# Patient Record
Sex: MALE | Race: WHITE | Employment: FULL TIME | URBAN - METROPOLITAN AREA
[De-identification: names, ages, dates, MRNs, and addresses within clinical notes are randomized per-mention and may not be internally consistent; named-entity substitution may affect disease eponyms.]

---

## 2017-01-13 ENCOUNTER — HOSPITAL ENCOUNTER (OUTPATIENT)
Dept: CV DIAGNOSTICS | Facility: HOSPITAL | Age: 57
End: 2017-01-13
Attending: ORTHOPAEDIC SURGERY
Payer: COMMERCIAL

## 2017-01-13 ENCOUNTER — HOSPITAL ENCOUNTER (OUTPATIENT)
Dept: CV DIAGNOSTICS | Facility: HOSPITAL | Age: 57
Discharge: HOME | End: 2017-01-13
Attending: ORTHOPAEDIC SURGERY
Payer: COMMERCIAL

## 2017-01-13 DIAGNOSIS — Z00.00 ROUTINE GENERAL MEDICAL EXAMINATION AT A HEALTH CARE FACILITY: ICD-10-CM

## 2017-01-13 NOTE — IMAGING NOTE
4096 - Patient here in cardiac diagnostics for outpatient stress echo ordered by Dr. Winsome Booker. Resting ECG, LBBB. In reviewing EMR, he demonstrated LBBB during a nuclear exercise stress test in 2015.  Patient states Dr. Winsome Booker ordered the stress echo modal

## 2017-01-19 ENCOUNTER — HOSPITAL ENCOUNTER (OUTPATIENT)
Dept: NUCLEAR MEDICINE | Facility: HOSPITAL | Age: 57
Discharge: HOME | End: 2017-01-19
Attending: EMERGENCY MEDICINE
Payer: COMMERCIAL

## 2017-01-19 ENCOUNTER — HOSPITAL ENCOUNTER (OUTPATIENT)
Dept: CV DIAGNOSTICS | Facility: HOSPITAL | Age: 57
Discharge: HOME | End: 2017-01-19
Attending: EMERGENCY MEDICINE
Payer: COMMERCIAL

## 2017-01-19 DIAGNOSIS — Z00.00 ROUTINE GENERAL MEDICAL EXAMINATION AT A HEALTH CARE FACILITY: ICD-10-CM

## 2017-01-19 PROCEDURE — 78452 HT MUSCLE IMAGE SPECT MULT: CPT | Performed by: RADIOLOGY

## 2017-01-19 PROCEDURE — 93017 CV STRESS TEST TRACING ONLY: CPT | Performed by: RADIOLOGY

## 2017-01-19 RX ORDER — SODIUM CHLORIDE 9 MG/ML
INJECTION, SOLUTION INTRAVENOUS
Status: COMPLETED
Start: 2017-01-19 | End: 2017-01-19

## 2017-01-19 RX ADMIN — SODIUM CHLORIDE: 9 INJECTION, SOLUTION INTRAVENOUS at 09:05:00

## 2017-02-02 ENCOUNTER — OFFICE VISIT (OUTPATIENT)
Dept: INTERNAL MEDICINE CLINIC | Facility: CLINIC | Age: 57
End: 2017-02-02

## 2017-02-02 VITALS
HEART RATE: 64 BPM | DIASTOLIC BLOOD PRESSURE: 78 MMHG | RESPIRATION RATE: 18 BRPM | WEIGHT: 189 LBS | HEIGHT: 70 IN | TEMPERATURE: 98 F | SYSTOLIC BLOOD PRESSURE: 138 MMHG | BODY MASS INDEX: 27.06 KG/M2

## 2017-02-02 DIAGNOSIS — Z82.49 FAMILY HISTORY OF HYPERTROPHIC CARDIOMYOPATHY: ICD-10-CM

## 2017-02-02 DIAGNOSIS — I10 ESSENTIAL HYPERTENSION: Primary | ICD-10-CM

## 2017-02-02 DIAGNOSIS — K50.00 CROHN'S DISEASE INVOLVING TERMINAL ILEUM (HCC): ICD-10-CM

## 2017-02-02 PROCEDURE — 99212 OFFICE O/P EST SF 10 MIN: CPT | Performed by: INTERNAL MEDICINE

## 2017-02-02 PROCEDURE — 99213 OFFICE O/P EST LOW 20 MIN: CPT | Performed by: INTERNAL MEDICINE

## 2017-02-02 RX ORDER — NEBIVOLOL 5 MG/1
5 TABLET ORAL
Qty: 90 TABLET | Refills: 3 | Status: SHIPPED | OUTPATIENT
Start: 2017-02-02 | End: 2018-03-24

## 2017-02-02 RX ORDER — PANTOPRAZOLE SODIUM 40 MG/1
40 TABLET, DELAYED RELEASE ORAL 2 TIMES DAILY
Qty: 180 TABLET | Refills: 3 | Status: SHIPPED | OUTPATIENT
Start: 2017-02-02 | End: 2021-10-29 | Stop reason: ALTCHOICE

## 2017-02-02 RX ORDER — EZETIMIBE AND SIMVASTATIN 10; 20 MG/1; MG/1
1 TABLET ORAL NIGHTLY
Qty: 90 TABLET | Refills: 3 | Status: SHIPPED | OUTPATIENT
Start: 2017-02-02 | End: 2018-03-24

## 2017-02-02 NOTE — PROGRESS NOTES
HPI:    Patient ID: Brice Franklin is a 65year old male. HPI  Patient is here to establish care with new physician. Previous doctor retired. Underlying Crohn's disease, hypertension. Medications reviewed. No recent changes.   Crohn's disease been w HENT: Negative for hearing loss. Eyes: Negative for visual disturbance. Respiratory: Negative for cough and shortness of breath. Cardiovascular: Negative for chest pain and palpitations.    Gastrointestinal: Negative for nausea, vomiting, abdomina mass. There is no tenderness. Musculoskeletal: He exhibits no tenderness. Lymphadenopathy:     He has no cervical adenopathy. Neurological: He is alert. Skin: Skin is warm and dry. No rash noted. Psychiatric: He has a normal mood and affect.  [facility]

## 2017-02-02 NOTE — TELEPHONE ENCOUNTER
Hypertensive Medications  Protocol Criteria:  · Appointment scheduled in the past 6 months or in the next 3 months  · BMP or CMP in the past 12 months  · Creatinine result < 2  Recent Visits       Provider Department Primary Dx    10 months ago MICHEL Crocker

## 2017-02-03 ENCOUNTER — HOSPITAL ENCOUNTER (OUTPATIENT)
Dept: CV DIAGNOSTICS | Facility: HOSPITAL | Age: 57
Discharge: HOME | End: 2017-02-03
Attending: INTERNAL MEDICINE
Payer: COMMERCIAL

## 2017-02-03 DIAGNOSIS — Z82.49 FAMILY HISTORY OF HYPERTROPHIC CARDIOMYOPATHY: ICD-10-CM

## 2017-02-03 DIAGNOSIS — I10 ESSENTIAL HYPERTENSION: ICD-10-CM

## 2017-02-03 PROCEDURE — 93306 TTE W/DOPPLER COMPLETE: CPT

## 2017-02-03 PROCEDURE — 93306 TTE W/DOPPLER COMPLETE: CPT | Performed by: INTERNAL MEDICINE

## 2017-02-03 RX ORDER — EZETIMIBE/SIMVASTATIN 10 MG-20MG
TABLET ORAL
Qty: 90 TABLET | Refills: 2 | OUTPATIENT
Start: 2017-02-03

## 2017-02-03 RX ORDER — NEBIVOLOL HYDROCHLORIDE 5 MG/1
TABLET ORAL
Qty: 90 TABLET | Refills: 2 | OUTPATIENT
Start: 2017-02-03

## 2017-02-20 RX ORDER — MESALAMINE 500 MG/1
CAPSULE ORAL
Qty: 540 CAPSULE | Refills: 0 | Status: SHIPPED | OUTPATIENT
Start: 2017-02-20 | End: 2017-03-23

## 2017-02-20 NOTE — TELEPHONE ENCOUNTER
Pending Prescriptions Disp Refills    PENTASA 500 MG Oral Cap CR [Pharmacy Med Name: PENTASA CR CAPS 500MG] 540 capsule 0     Sig: TAKE 3 CAPSULES (1500 MG TOTAL) TWICE A DAY         LOV 11/16/15  LR 06/21/16  EGD 12/22/15    em

## 2017-02-20 NOTE — TELEPHONE ENCOUNTER
Please contact the patient. I have refilled his prescription for 3 months. He should be seen in the office in follow-up.

## 2017-03-23 ENCOUNTER — OFFICE VISIT (OUTPATIENT)
Dept: GASTROENTEROLOGY | Facility: CLINIC | Age: 57
End: 2017-03-23

## 2017-03-23 VITALS
WEIGHT: 194.19 LBS | BODY MASS INDEX: 27.8 KG/M2 | HEIGHT: 70 IN | DIASTOLIC BLOOD PRESSURE: 82 MMHG | HEART RATE: 73 BPM | SYSTOLIC BLOOD PRESSURE: 115 MMHG

## 2017-03-23 DIAGNOSIS — K50.00 CROHN'S DISEASE INVOLVING TERMINAL ILEUM (HCC): Primary | ICD-10-CM

## 2017-03-23 PROCEDURE — 99213 OFFICE O/P EST LOW 20 MIN: CPT | Performed by: INTERNAL MEDICINE

## 2017-03-23 PROCEDURE — 99212 OFFICE O/P EST SF 10 MIN: CPT | Performed by: INTERNAL MEDICINE

## 2017-03-23 NOTE — PATIENT INSTRUCTIONS
1.  Continue current medications. 2.  Blood work yearly. 3.  Follow-up upper endoscopy later this year or early next year. 4.  Follow-up colonoscopy in June 2019.  5.  Follow-up office visit yearly.

## 2017-03-23 NOTE — PROGRESS NOTES
HPI:    Patient ID: Kari Hernandez is a 65year old male. HPI  Carmen Ramirez returns in follow-up. He was last seen at the time of his upper endoscopy in December 2015.   As per previous notes he has over a 20 year history of short segment (7 cm) ileal Crohn's d 500 MG Oral Cap CR TAKE 3 CAPSULES (1500 MG TOTAL) TWICE A DAY Disp: 540 capsule Rfl: 0   Ezetimibe-Simvastatin (VYTORIN) 10-20 MG Oral Tab Take 1 tablet by mouth nightly.  at bedtime Disp: 90 tablet Rfl: 3   Nebivolol HCl (BYSTOLIC) 5 MG Oral Tab Take 1 ta There was a focal area of inflammation in the transverse colon. He is asymptomatic on mesalamine. I am recommending that he continue. Refills for 1 year provided. I have asked him to avoid NSAIDs as much as possible.   Follow-up colonoscopy would be con

## 2017-04-29 ENCOUNTER — HOSPITAL ENCOUNTER (OUTPATIENT)
Age: 57
Discharge: HOME | End: 2017-04-29
Payer: COMMERCIAL

## 2017-04-29 ENCOUNTER — APPOINTMENT (OUTPATIENT)
Dept: GENERAL RADIOLOGY | Age: 57
End: 2017-04-29
Attending: PHYSICIAN ASSISTANT
Payer: COMMERCIAL

## 2017-04-29 VITALS
RESPIRATION RATE: 12 BRPM | OXYGEN SATURATION: 96 % | TEMPERATURE: 97 F | SYSTOLIC BLOOD PRESSURE: 129 MMHG | WEIGHT: 185 LBS | HEIGHT: 70 IN | BODY MASS INDEX: 26.48 KG/M2 | DIASTOLIC BLOOD PRESSURE: 74 MMHG | HEART RATE: 56 BPM

## 2017-04-29 DIAGNOSIS — S20.221A BACK CONTUSION, RIGHT, INITIAL ENCOUNTER: Primary | ICD-10-CM

## 2017-04-29 PROCEDURE — 71100 X-RAY EXAM RIBS UNI 2 VIEWS: CPT

## 2017-04-29 PROCEDURE — 99213 OFFICE O/P EST LOW 20 MIN: CPT

## 2017-04-29 PROCEDURE — 99214 OFFICE O/P EST MOD 30 MIN: CPT

## 2017-04-29 RX ORDER — CYCLOBENZAPRINE HCL 10 MG
10 TABLET ORAL 3 TIMES DAILY PRN
Qty: 20 TABLET | Refills: 0 | Status: SHIPPED | OUTPATIENT
Start: 2017-04-29 | End: 2017-05-06

## 2017-04-29 RX ORDER — HYDROCODONE BITARTRATE AND ACETAMINOPHEN 5; 325 MG/1; MG/1
1-2 TABLET ORAL EVERY 4 HOURS PRN
Qty: 15 TABLET | Refills: 0 | Status: SHIPPED | OUTPATIENT
Start: 2017-04-29 | End: 2017-05-06

## 2017-04-29 NOTE — ED PROVIDER NOTES
[facility]    History   Patient presents with:  Back Pain (musculoskeletal)    Stated Complaint: Lower Back Pain    HPI    Patient is a 43-year-old male who presents for evaluation of right-sided back franc Fexofenadine-Pseudoephed ER (ALLEGRA-D)  MG Oral Tablet 12 Hr,  Take 1 tablet by mouth 2 (two) times daily. acetaminophen (TYLENOL EXTRA STRENGTH) 500 MG Oral Tab,  Take  by mouth. ibuprofen (ADVIL) 200 MG Oral Tab,  Take  by mouth.        Family Head: Normocephalic and atraumatic.    Right Ear: External ear normal.   Left Ear: External ear normal.   Nose: Nose normal.   Mouth/Throat: Oropharynx is clear and moist.   Eyes: Conjunctivae and EOM are normal. Pupils are equal, round, and reactive to lig

## 2017-04-29 NOTE — ED INITIAL ASSESSMENT (HPI)
Pt arrived ambulatory to rm #2 with steady gait. Pt sts \"I was walking on the stairs and my dog ran into me. I fell over him and my lower back landed on the stairs\" Pt denies n/v/d. Denies dysuria. Pt denies numbness/tingling to legs.

## 2017-10-14 ENCOUNTER — HOSPITAL ENCOUNTER (OUTPATIENT)
Age: 57
Discharge: HOME | End: 2017-10-14
Attending: EMERGENCY MEDICINE
Payer: COMMERCIAL

## 2017-10-14 VITALS
TEMPERATURE: 97 F | HEART RATE: 64 BPM | SYSTOLIC BLOOD PRESSURE: 137 MMHG | DIASTOLIC BLOOD PRESSURE: 74 MMHG | OXYGEN SATURATION: 100 % | WEIGHT: 185 LBS | HEIGHT: 70 IN | RESPIRATION RATE: 18 BRPM | BODY MASS INDEX: 26.48 KG/M2

## 2017-10-14 DIAGNOSIS — S29.011A MUSCLE STRAIN OF CHEST WALL, INITIAL ENCOUNTER: Primary | ICD-10-CM

## 2017-10-14 DIAGNOSIS — S29.019A STRAIN OF THORACIC REGION, INITIAL ENCOUNTER: ICD-10-CM

## 2017-10-14 PROCEDURE — 99214 OFFICE O/P EST MOD 30 MIN: CPT

## 2017-10-14 PROCEDURE — 99213 OFFICE O/P EST LOW 20 MIN: CPT

## 2017-10-14 RX ORDER — METAXALONE 800 MG/1
800 TABLET ORAL EVERY 8 HOURS PRN
Qty: 20 TABLET | Refills: 0 | Status: SHIPPED | OUTPATIENT
Start: 2017-10-14 | End: 2018-05-30

## 2017-10-14 NOTE — ED INITIAL ASSESSMENT (HPI)
REPORTS LEFT SIDED MID BACK PAIN, PATIENT STATES HE WENT TO  AN OBJECT FROM THE FLOOR YESTERDAY AND EXPERIENCED A SPASM IN THAT AREA. DENIES DIFFICULTLY URINATING.

## 2017-10-14 NOTE — ED PROVIDER NOTES
Patient Seen in: [facility]    History   Patient presents with:  Back Pain (musculoskeletal)    Stated Complaint: Back pain    HPI    The patient is a 21-year-old male with a history of Crohn's disease who presents with c elements reviewed from today and agreed except as otherwise stated in HPI.     Physical Exam   ED Triage Vitals [10/14/17 1033]  BP: 137/74  Pulse: 64  Resp: 18  Temp: (!) 97.3 °F (36.3 °C)  Temp src: Oral  SpO2: 100 %  O2 Device: None (Room air)    Current for a visit in 2 days  For Recheck      Medications Prescribed:  Discharge Medication List as of 10/14/2017 10:57 AM    START taking these medications    Metaxalone 800 MG Oral Tab  Take 1 tablet (800 mg total) by mouth every 8 (eight) hours as needed for

## 2017-10-19 ENCOUNTER — OFFICE VISIT (OUTPATIENT)
Dept: INTERNAL MEDICINE CLINIC | Facility: CLINIC | Age: 57
End: 2017-10-19

## 2017-10-19 VITALS
HEART RATE: 61 BPM | WEIGHT: 191 LBS | DIASTOLIC BLOOD PRESSURE: 82 MMHG | BODY MASS INDEX: 27.35 KG/M2 | TEMPERATURE: 98 F | HEIGHT: 70 IN | SYSTOLIC BLOOD PRESSURE: 126 MMHG

## 2017-10-19 DIAGNOSIS — M54.50 ACUTE LEFT-SIDED LOW BACK PAIN WITHOUT SCIATICA: Primary | ICD-10-CM

## 2017-10-19 PROCEDURE — 99213 OFFICE O/P EST LOW 20 MIN: CPT | Performed by: INTERNAL MEDICINE

## 2017-10-19 PROCEDURE — 99212 OFFICE O/P EST SF 10 MIN: CPT | Performed by: INTERNAL MEDICINE

## 2017-10-19 NOTE — PROGRESS NOTES
Patient ID: Rosario Fulton is a 65year old male. Patient presents with: Follow - Up: injured back while bending over 10/14 went to immediate care, need clearance note for work       HISTORY OF PRESENT ILLNESS:   HPI  Patient presents for above.   He in (BYSTOLIC) 5 MG Oral Tab, Take 1 tablet (5 mg total) by mouth once daily. , Disp: 90 tablet, Rfl: 3  •  Pantoprazole Sodium 40 MG Oral Tab EC, Take 1 tablet (40 mg total) by mouth 2 (two) times daily. , Disp: 180 tablet, Rfl: 3  •  Fexofenadine-Pseudoephed E

## 2017-10-19 NOTE — PATIENT INSTRUCTIONS
Back Care Tips    Caring for your back  These are things you can do to prevent a recurrence of acute back pain and to reduce symptoms from chronic back pain:  · Maintain a healthy weight.  If you are overweight, losing weight will help most types of back · Be careful if you are given prescription pain medicines, narcotics, or medicine for muscle spasm. They can cause drowsiness, affect your coordination, reflexes, and judgment. Do not drive or operate heavy machinery while taking these types of medicines. The best way to sleep is on your side with your knees bent. Put a low pillow under your head to support your neck in a neutral spine position. Avoid thick pillows that bend your neck to one side.  Put a pillow between your legs to further relax your lower b The point of stretching is to [facility] more flexible and increase your range of motion. Stretch only as much as you are able. Stretch slowly. Do not push your stretch to the limit.  If at any point you feel pain while stretching, this is your (temporary) li 2. Heel raises: Stand facing the wall. Slowly raise the heels of your feet up and down, while keeping your toes on the floor. If you have trouble balancing, you can touch the wall with your hands. Repeat 10 times.   More advanced exercises  When you feel co © 0236-9357 [employer] [address]. [address] [facility] [place] [place]. All rights reserved. This information is not intended as a substitute for professional medical care. Always follow your healthcare professional's instructions.

## 2017-11-09 ENCOUNTER — OFFICE VISIT (OUTPATIENT)
Dept: OTHER | Facility: HOSPITAL | Age: 57
End: 2017-11-09
Attending: ORTHOPAEDIC SURGERY

## 2017-11-09 DIAGNOSIS — Z00.00 ROUTINE GENERAL MEDICAL EXAMINATION AT A HEALTH CARE FACILITY: Primary | ICD-10-CM

## 2017-11-09 PROCEDURE — 85025 COMPLETE CBC W/AUTO DIFF WBC: CPT

## 2017-11-09 PROCEDURE — 80053 COMPREHEN METABOLIC PANEL: CPT

## 2017-11-09 PROCEDURE — 84153 ASSAY OF PSA TOTAL: CPT

## 2017-11-09 PROCEDURE — 80061 LIPID PANEL: CPT

## 2017-11-09 PROCEDURE — 81001 URINALYSIS AUTO W/SCOPE: CPT

## 2017-11-21 ENCOUNTER — APPOINTMENT (OUTPATIENT)
Dept: OTHER | Facility: HOSPITAL | Age: 57
End: 2017-11-21
Attending: ORTHOPAEDIC SURGERY

## 2018-01-12 ENCOUNTER — OFFICE VISIT (OUTPATIENT)
Dept: INTERNAL MEDICINE CLINIC | Facility: CLINIC | Age: 58
End: 2018-01-12

## 2018-01-12 VITALS
BODY MASS INDEX: 27.63 KG/M2 | HEART RATE: 64 BPM | DIASTOLIC BLOOD PRESSURE: 81 MMHG | HEIGHT: 70 IN | SYSTOLIC BLOOD PRESSURE: 120 MMHG | TEMPERATURE: 98 F | WEIGHT: 193 LBS

## 2018-01-12 DIAGNOSIS — R42 VERTIGO: Primary | ICD-10-CM

## 2018-01-12 DIAGNOSIS — H92.03 EAR PAIN, BILATERAL: ICD-10-CM

## 2018-01-12 PROCEDURE — 99212 OFFICE O/P EST SF 10 MIN: CPT | Performed by: INTERNAL MEDICINE

## 2018-01-12 PROCEDURE — 99214 OFFICE O/P EST MOD 30 MIN: CPT | Performed by: INTERNAL MEDICINE

## 2018-01-12 RX ORDER — ONDANSETRON 4 MG/1
4 TABLET, ORALLY DISINTEGRATING ORAL EVERY 8 HOURS PRN
Qty: 20 TABLET | Refills: 0 | Status: SHIPPED | OUTPATIENT
Start: 2018-01-12

## 2018-01-12 NOTE — PROGRESS NOTES
Patient ID: Leelee Espinosa is a 63year old male. Patient presents with:  Vertigo: on/off 6 months, has been getting worse. HISTORY OF PRESENT ILLNESS:   HPI  Patient presents for above. Here with a six-month history of vertigo.   States he had si Outpatient Prescriptions:   •  ondansetron 4 MG Oral Tablet Dispersible, Take 1 tablet (4 mg total) by mouth every 8 (eight) hours as needed for Nausea., Disp: 20 tablet, Rfl: 0  •  Mesalamine ER (PENTASA) 500 MG Oral Cap CR, Take 3 capsules (1,500 mg tota well-developed and well-nourished. HENT:   Right Ear: External ear normal.   Left Ear: External ear normal.   Mouth/Throat: No oropharyngeal exudate, posterior oropharyngeal edema, posterior oropharyngeal erythema or tonsillar abscesses.    Eyes: Conjunct

## 2018-01-16 ENCOUNTER — OFFICE VISIT (OUTPATIENT)
Dept: OTOLARYNGOLOGY | Facility: CLINIC | Age: 58
End: 2018-01-16

## 2018-01-16 ENCOUNTER — OFFICE VISIT (OUTPATIENT)
Dept: AUDIOLOGY | Facility: CLINIC | Age: 58
End: 2018-01-16

## 2018-01-16 VITALS
DIASTOLIC BLOOD PRESSURE: 76 MMHG | BODY MASS INDEX: 27.63 KG/M2 | TEMPERATURE: 98 F | HEIGHT: 70 IN | WEIGHT: 193 LBS | SYSTOLIC BLOOD PRESSURE: 118 MMHG

## 2018-01-16 DIAGNOSIS — R42 DIZZINESS AND GIDDINESS: Primary | ICD-10-CM

## 2018-01-16 DIAGNOSIS — R42 DIZZINESS: Primary | ICD-10-CM

## 2018-01-16 PROCEDURE — 92567 TYMPANOMETRY: CPT | Performed by: AUDIOLOGIST

## 2018-01-16 PROCEDURE — 99243 OFF/OP CNSLTJ NEW/EST LOW 30: CPT | Performed by: OTOLARYNGOLOGY

## 2018-01-16 PROCEDURE — 92557 COMPREHENSIVE HEARING TEST: CPT | Performed by: AUDIOLOGIST

## 2018-01-16 PROCEDURE — 99212 OFFICE O/P EST SF 10 MIN: CPT | Performed by: OTOLARYNGOLOGY

## 2018-01-16 NOTE — PROGRESS NOTES
AUDIOGRAM     Leanne Mir was referred for testing by Alisia Nguyen V due to dizziness. 1/8/1960  [id number]      Otoscopic Inspection:  Right ear:  No cerumen  Left ear:  No cerumen    Audiometric Test Results:   Audiometric thresholds indicated norm

## 2018-01-16 NOTE — PROGRESS NOTES
Karely Parsons is a 63year old male. Patient presents with:  Dizziness: dizziness for a while    HPI:   For the last several months he has been experiencing dizziness. He describes issues with feeling as though he will fall over while walking.  He did hav well otherwise  GENERAL : denies fever, chills, sweats, weight loss, weight gain  SKIN: denies any unusual skin lesions or rashes  RESPIRATORY: denies shortness of breath with exertion  NEURO: denies headaches    EXAM:   /76   Temp (!) 97.5 °F (36.4

## 2018-01-19 ENCOUNTER — OFFICE VISIT (OUTPATIENT)
Dept: AUDIOLOGY | Facility: CLINIC | Age: 58
End: 2018-01-19

## 2018-01-19 DIAGNOSIS — R42 DIZZINESS: Primary | ICD-10-CM

## 2018-01-19 PROCEDURE — 92585 AUDITORY EVOKED POTENTIAL: CPT | Performed by: AUDIOLOGIST

## 2018-01-19 PROCEDURE — 92540 BASIC VESTIBULAR EVALUATION: CPT | Performed by: AUDIOLOGIST

## 2018-01-19 PROCEDURE — 92537 CALORIC VSTBLR TEST W/REC: CPT | Performed by: AUDIOLOGIST

## 2018-01-22 ENCOUNTER — TELEPHONE (OUTPATIENT)
Dept: OTOLARYNGOLOGY | Facility: CLINIC | Age: 58
End: 2018-01-22

## 2018-01-22 DIAGNOSIS — R42 DIZZINESS: Primary | ICD-10-CM

## 2018-01-23 NOTE — PROGRESS NOTES
Videonystagmography   (VNG)    Suzanna Ramos is a 63year old male     Referring Provider: Vianey Martell   YOB: 1960  Medical Record: [id number]                           History:  Patient indicated a previous history of positional vertigo sufficient strength to be considered clinically significant.      Bithermal Caloric Test:  Left cool caloric:   14  deg/sec  Right cool caloric:  15 deg/sec  Left warm caloric:    9 deg/sec  Right warm caloric:  13 deg/sec    NOTE:  Patient could not comple

## 2018-01-23 NOTE — TELEPHONE ENCOUNTER
Patient called back again to check status on results. Relayed message below from . Will await call back for results and was thankful. Please advise. Thank you.

## 2018-01-23 NOTE — TELEPHONE ENCOUNTER
Please let him know that the test takes a few days to interpret. I will let him know when I get the results.

## 2018-01-24 NOTE — TELEPHONE ENCOUNTER
I thought I sent a message that his VNG shows a weakness in the balance system in the inner ear. I would recommend vestibular rehab for him.

## 2018-01-24 NOTE — TELEPHONE ENCOUNTER
Per :  Please inform the patient that the VN G does show that there may be some weakness in the inner ear especially on the left.   I would recommend that he consider vestibular rehab      Electronically signed by Stas Restrepo MD at 2018  6:03 PM

## 2018-01-24 NOTE — PROGRESS NOTES
Please inform the patient that the VN G does show that there may be some weakness in the inner ear especially on the left.   I would recommend that he consider vestibular rehab

## 2018-01-25 ENCOUNTER — OFFICE VISIT (OUTPATIENT)
Dept: INTERNAL MEDICINE CLINIC | Facility: CLINIC | Age: 58
End: 2018-01-25

## 2018-01-25 VITALS
HEIGHT: 70 IN | BODY MASS INDEX: 27.49 KG/M2 | HEART RATE: 56 BPM | WEIGHT: 192 LBS | TEMPERATURE: 98 F | SYSTOLIC BLOOD PRESSURE: 129 MMHG | DIASTOLIC BLOOD PRESSURE: 84 MMHG

## 2018-01-25 DIAGNOSIS — R42 VERTIGO: Primary | ICD-10-CM

## 2018-01-25 PROCEDURE — 99213 OFFICE O/P EST LOW 20 MIN: CPT | Performed by: INTERNAL MEDICINE

## 2018-01-25 PROCEDURE — 99212 OFFICE O/P EST SF 10 MIN: CPT | Performed by: INTERNAL MEDICINE

## 2018-01-25 NOTE — PATIENT INSTRUCTIONS
Dizziness (Uncertain Cause)  Dizziness is a common symptom. It may be described as lightheadedness, spinning, or feeling like you are going to faint. Dizziness can have many causes.   Be sure to tell the healthcare provider about:  · All medicines you katherine Date Last Reviewed: 11/1/2017  © 9891-4634 [employer] [address]. [address] [facility] [place], [address] [place]. All rights reserved. This information is not intended as a substitute for professional medical care.  Always follow your healthcare professional

## 2018-01-26 ENCOUNTER — OFFICE VISIT (OUTPATIENT)
Dept: PHYSICAL THERAPY | Facility: HOSPITAL | Age: 58
End: 2018-01-26
Attending: OTOLARYNGOLOGY
Payer: COMMERCIAL

## 2018-01-26 DIAGNOSIS — R42 DIZZINESS: ICD-10-CM

## 2018-01-26 PROCEDURE — 97161 PT EVAL LOW COMPLEX 20 MIN: CPT

## 2018-01-26 NOTE — PROGRESS NOTES
PHYSICAL THERAPY EVALUATION:   Referring Physician: Dr. Kai Posey  Diagnosis: BPPV      Date of Onset: Per HPI Date of Service: 1/26/2018     PATIENT SUMMARY   Kitty Canavan is a 63year old y/o male who presents to therapy today with reports of vertigo posterior canal BPPV. CRM performed x3 with resolution of nystagmus on third CRM. Maneuvers tolerated well. Pt verbalized understanding and appreciation of material taught.   Pt. would benefit from skilled Physical Therapy to address the above impairments t tie shoes without symptoms. Frequency / Duration: Patient will be seen for 1-2 x/week or a total of 6 visits over a 90 day period.   Treatment will include: Home exercise program development and instruction, Patient/family education, Balance training, C

## 2018-01-29 ENCOUNTER — OFFICE VISIT (OUTPATIENT)
Dept: PHYSICAL THERAPY | Facility: HOSPITAL | Age: 58
End: 2018-01-29
Attending: OTOLARYNGOLOGY
Payer: COMMERCIAL

## 2018-01-29 DIAGNOSIS — R42 DIZZINESS: ICD-10-CM

## 2018-01-29 PROCEDURE — 95992 CANALITH REPOSITIONING PROC: CPT

## 2018-01-29 NOTE — PROGRESS NOTES
Diagnosis: BPPV  Visit (# authorized): 2      Referring MD(next visit): Dr. Kari Hernandez V  Precautions:  None  Medication Changes since last visit?: No    Subjective: Denies positional symptoms since day of last visit. No current complaints of dizziness.      Obj

## 2018-01-31 ENCOUNTER — APPOINTMENT (OUTPATIENT)
Dept: PHYSICAL THERAPY | Facility: HOSPITAL | Age: 58
End: 2018-01-31
Attending: OTOLARYNGOLOGY
Payer: COMMERCIAL

## 2018-02-02 ENCOUNTER — TELEPHONE (OUTPATIENT)
Dept: OTOLARYNGOLOGY | Facility: CLINIC | Age: 58
End: 2018-02-02

## 2018-02-02 ENCOUNTER — OFFICE VISIT (OUTPATIENT)
Dept: PHYSICAL THERAPY | Facility: HOSPITAL | Age: 58
End: 2018-02-02
Attending: OTOLARYNGOLOGY
Payer: COMMERCIAL

## 2018-02-02 ENCOUNTER — TELEPHONE (OUTPATIENT)
Dept: ADMINISTRATIVE | Age: 58
End: 2018-02-02

## 2018-02-02 PROCEDURE — 97112 NEUROMUSCULAR REEDUCATION: CPT

## 2018-02-02 NOTE — PROGRESS NOTES
Patient Name: Leelee Espinosa, : 1960, MRN: [id number]   Date:  2018  Referring Physician:  Gloria Adams V    Diagnosis: BPPV    Discharge Summary    Pt has attended 3, cancelled 0, and no shown 0 visits in Physical Therapy.      Progress Note Time: ____6___seconds  __3____9. Ambulating backward  __3____10.  Steps    TOTAL SCORE: __30__/30    (less than 22/30 = fall risk)    Romberg: EO 30 sec EC 30 sec (sway WNL)  Tandem: R foot back: EO 30 sec, EC 30 sec; L foot back: EO: 30 sec, EC: 30 sec   S

## 2018-02-02 NOTE — PATIENT INSTRUCTIONS
Do these exercises 1-2 times a day. 1. Forearm plank, head in neutral. 30 seconds, 2 times. Side planks x1 on each side for 30 seconds. 2. Stand next to a countertop or with your bed behind you for safety. Stand tall on one foot.   Cross arms, clos

## 2018-02-02 NOTE — TELEPHONE ENCOUNTER
Patient finished physical therapy today, wants a return to work note and wants to talk to Dr Meera Hampton.

## 2018-02-02 NOTE — TELEPHONE ENCOUNTER
SARA packet emailed to pt '[email or web address]'. His HR requires form completed, pt has been out for more than 3 days for vertigo. Forms pending.  NK

## 2018-02-03 NOTE — TELEPHONE ENCOUNTER
Per pt, he is doing well; he is not experiencing the spinning sensation, no eye movement, has some balance issues every now and then but was given exercises from physical therapy. Pt is requesting a [id number]-[id number] note for Monday.     Dr. Leelee Espinosa, return to

## 2018-02-06 ENCOUNTER — HOSPITAL ENCOUNTER (OUTPATIENT)
Dept: GENERAL RADIOLOGY | Age: 58
Discharge: HOME | End: 2018-02-06
Attending: INTERNAL MEDICINE
Payer: COMMERCIAL

## 2018-02-06 ENCOUNTER — APPOINTMENT (OUTPATIENT)
Dept: PHYSICAL THERAPY | Facility: HOSPITAL | Age: 58
End: 2018-02-06
Attending: OTOLARYNGOLOGY
Payer: COMMERCIAL

## 2018-02-06 ENCOUNTER — OFFICE VISIT (OUTPATIENT)
Dept: INTERNAL MEDICINE CLINIC | Facility: CLINIC | Age: 58
End: 2018-02-06

## 2018-02-06 VITALS
WEIGHT: 192 LBS | HEART RATE: 66 BPM | HEIGHT: 70 IN | SYSTOLIC BLOOD PRESSURE: 131 MMHG | TEMPERATURE: 98 F | BODY MASS INDEX: 27.49 KG/M2 | DIASTOLIC BLOOD PRESSURE: 76 MMHG

## 2018-02-06 DIAGNOSIS — M54.2 CERVICALGIA: ICD-10-CM

## 2018-02-06 DIAGNOSIS — R42 VERTIGO: Primary | ICD-10-CM

## 2018-02-06 PROCEDURE — 99213 OFFICE O/P EST LOW 20 MIN: CPT | Performed by: INTERNAL MEDICINE

## 2018-02-06 PROCEDURE — 99212 OFFICE O/P EST SF 10 MIN: CPT | Performed by: INTERNAL MEDICINE

## 2018-02-06 PROCEDURE — 72050 X-RAY EXAM NECK SPINE 4/5VWS: CPT | Performed by: INTERNAL MEDICINE

## 2018-02-06 RX ORDER — METHOCARBAMOL 750 MG/1
750 TABLET, FILM COATED ORAL 3 TIMES DAILY PRN
Qty: 30 TABLET | Refills: 0 | Status: SHIPPED | OUTPATIENT
Start: 2018-02-06 | End: 2018-05-30

## 2018-02-06 NOTE — PATIENT INSTRUCTIONS
Nonsurgical Treatment of Cervical Spine Problems  Cervical spine problems can often be treated without surgery. Choices include rest, medicines, or injections. Your healthcare provider may also suggest physical therapy or certain exercises.  These may all © 9937-4053 [employer] [address]. [address] [facility] [place], [address] [place]. All rights reserved. This information is not intended as a substitute for professional medical care. Always follow your healthcare professional's instructions.         Reach a © 3384-0562 [employer] [address]. [address] [facility] [place], [facility] [place]. All rights reserved. This information is not intended as a substitute for professional medical care. Always follow your healthcare professional's instructions.         Shoulde To start, sit in a chair with your feet flat on the floor. Shift your weight slightly forward to avoid rounding your back. Relax. Keep your ears, shoulders, and hips aligned:  · Raise your arms to shoulder height, elbows bent and palms forward.   · Move you · Raise one arm overhead, then lower it. As you lower that arm, raise the other arm. · Continue to move both arms in slow, smooth arcs. Keep your arms straight and your head and neck relaxed. · Repeat 10 times with each arm.   For your safety, check with

## 2018-02-06 NOTE — PROGRESS NOTES
Patient ID: Matilde Dow is a 63year old male. Patient presents with:  Neck Pain: along with headaches that may be related and back pain has not improved. HISTORY OF PRESENT ILLNESS:   HPI  Patient presents for above.   Here with neck pain with (two) times daily. , Disp: 540 capsule, Rfl: 3  •  Ezetimibe-Simvastatin (VYTORIN) 10-20 MG Oral Tab, Take 1 tablet by mouth nightly.  at bedtime, Disp: 90 tablet, Rfl: 3  •  Nebivolol HCl (BYSTOLIC) 5 MG Oral Tab, Take 1 tablet (5 mg total) by mouth once da tenderness present. Cardiovascular: Normal rate, regular rhythm and normal heart sounds. Pulmonary/Chest: Effort normal and breath sounds normal. No respiratory distress. Musculoskeletal: Normal range of motion.    Neurological: He is alert and orien

## 2018-02-09 ENCOUNTER — APPOINTMENT (OUTPATIENT)
Dept: PHYSICAL THERAPY | Facility: HOSPITAL | Age: 58
End: 2018-02-09
Attending: OTOLARYNGOLOGY
Payer: COMMERCIAL

## 2018-02-12 ENCOUNTER — APPOINTMENT (OUTPATIENT)
Dept: PHYSICAL THERAPY | Facility: HOSPITAL | Age: 58
End: 2018-02-12
Attending: OTOLARYNGOLOGY
Payer: COMMERCIAL

## 2018-02-13 PROBLEM — H35.413 LATTICE DEGENERATION OF BOTH RETINAS: Status: ACTIVE | Noted: 2018-02-13

## 2018-02-13 PROBLEM — H35.033 HYPERTENSIVE RETINOPATHY OF BOTH EYES, GRADE 1: Status: ACTIVE | Noted: 2018-02-13

## 2018-02-15 ENCOUNTER — APPOINTMENT (OUTPATIENT)
Dept: PHYSICAL THERAPY | Facility: HOSPITAL | Age: 58
End: 2018-02-15
Attending: OTOLARYNGOLOGY
Payer: COMMERCIAL

## 2018-02-19 ENCOUNTER — APPOINTMENT (OUTPATIENT)
Dept: PHYSICAL THERAPY | Facility: HOSPITAL | Age: 58
End: 2018-02-19
Attending: OTOLARYNGOLOGY
Payer: COMMERCIAL

## 2018-03-25 RX ORDER — NEBIVOLOL HYDROCHLORIDE 5 MG/1
TABLET ORAL
Qty: 90 TABLET | Refills: 0 | Status: SHIPPED | OUTPATIENT
Start: 2018-03-25 | End: 2018-06-21

## 2018-03-25 RX ORDER — EZETIMIBE AND SIMVASTATIN 10; 20 MG/1; MG/1
TABLET ORAL
Qty: 90 TABLET | Refills: 0 | Status: SHIPPED | OUTPATIENT
Start: 2018-03-25 | End: 2018-06-21

## 2018-03-25 NOTE — TELEPHONE ENCOUNTER
Hypertensive Medications  Protocol Criteria:  · Appointment scheduled in the past 6 months or in the next 3 months  · BMP or CMP in the past 12 months  · Creatinine result < 2  Recent Outpatient Visits            1 month ago Hypertensive retinopathy of bot month ago [facility]    Office Visit    1 month ago [facility]    Office Visit        Future Appointments       Provider Department Appt Notes

## 2018-03-27 ENCOUNTER — APPOINTMENT (OUTPATIENT)
Dept: LAB | Age: 58
End: 2018-03-27
Attending: INTERNAL MEDICINE
Payer: COMMERCIAL

## 2018-03-27 ENCOUNTER — OFFICE VISIT (OUTPATIENT)
Dept: INTERNAL MEDICINE CLINIC | Facility: CLINIC | Age: 58
End: 2018-03-27

## 2018-03-27 VITALS
TEMPERATURE: 98 F | DIASTOLIC BLOOD PRESSURE: 80 MMHG | HEART RATE: 91 BPM | BODY MASS INDEX: 27.49 KG/M2 | HEIGHT: 70 IN | WEIGHT: 192 LBS | SYSTOLIC BLOOD PRESSURE: 128 MMHG

## 2018-03-27 DIAGNOSIS — Z00.00 ANNUAL PHYSICAL EXAM: Primary | ICD-10-CM

## 2018-03-27 DIAGNOSIS — Z00.00 ANNUAL PHYSICAL EXAM: ICD-10-CM

## 2018-03-27 DIAGNOSIS — K50.00 CROHN'S DISEASE INVOLVING TERMINAL ILEUM (HCC): ICD-10-CM

## 2018-03-27 DIAGNOSIS — I10 ESSENTIAL HYPERTENSION: ICD-10-CM

## 2018-03-27 DIAGNOSIS — R31.29 MICROSCOPIC HEMATURIA: ICD-10-CM

## 2018-03-27 DIAGNOSIS — N50.812 TESTICULAR PAIN, LEFT: ICD-10-CM

## 2018-03-27 DIAGNOSIS — R42 VERTIGO: ICD-10-CM

## 2018-03-27 LAB
BACTERIA UR QL AUTO: NEGATIVE /HPF
BILIRUB UR QL: NEGATIVE
CLARITY UR: CLEAR
COLOR UR: YELLOW
CREAT UR-MCNC: 151.2 MG/DL
GLUCOSE UR-MCNC: NEGATIVE MG/DL
KETONES UR-MCNC: NEGATIVE MG/DL
LEUKOCYTE ESTERASE UR QL STRIP.AUTO: NEGATIVE
MICROALBUMIN UR-MCNC: 0.7 MG/DL (ref 0–1.8)
MICROALBUMIN/CREAT UR: 4.6 MG/G{CREAT} (ref 0–20)
NITRITE UR QL STRIP.AUTO: NEGATIVE
PH UR: 5 [PH] (ref 5–8)
PROT UR-MCNC: NEGATIVE MG/DL
RBC #/AREA URNS AUTO: 2 /HPF
SP GR UR STRIP: 1.02 (ref 1–1.03)
TSH SERPL-ACNC: 1.15 UIU/ML (ref 0.45–5.33)
UROBILINOGEN UR STRIP-ACNC: <2
VIT C UR-MCNC: NEGATIVE MG/DL
WBC #/AREA URNS AUTO: 1 /HPF

## 2018-03-27 PROCEDURE — 36415 COLL VENOUS BLD VENIPUNCTURE: CPT

## 2018-03-27 PROCEDURE — 82043 UR ALBUMIN QUANTITATIVE: CPT

## 2018-03-27 PROCEDURE — 81001 URINALYSIS AUTO W/SCOPE: CPT

## 2018-03-27 PROCEDURE — 84443 ASSAY THYROID STIM HORMONE: CPT

## 2018-03-27 PROCEDURE — 82570 ASSAY OF URINE CREATININE: CPT

## 2018-03-27 PROCEDURE — 99396 PREV VISIT EST AGE 40-64: CPT | Performed by: INTERNAL MEDICINE

## 2018-03-27 PROCEDURE — 99213 OFFICE O/P EST LOW 20 MIN: CPT | Performed by: INTERNAL MEDICINE

## 2018-03-27 NOTE — PATIENT INSTRUCTIONS
Prevention Guidelines, Men Ages 48 to 59  Screening tests and vaccines are an important part of managing your health. Health counseling is essential, too. Below are guidelines for these, for men ages 48 to 59.  Talk with your healthcare provider to make s Prostate cancer Starting at age 39, talk to healthcare provider about risks and benefits of digital rectal exam (KIMBERLEY) and prostate-specific antigen (PSA) screening1 At routine exams   Syphilis Men at increased risk for infection – talk with your healthcare pertussis (Td/Tdap) booster All men in this age group Td every 10 years, or a one-time dose of Tdap instead of a Td booster after age 25, then Td every 8 years   Zoster All men ages 61 and older 1 dose   Counseling Who needs it How often   Diet and exerci To evaluate your condition, your doctor will first confirm that blood is indeed present. Then other tests are done to pinpoint where the blood is coming from and why. Your doctor will decide which tests will best determine the cause of your hematuria.  Some · To relieve pain and swelling, apply an ice pack wrapped in a thin towel for 10 minutes at a time. Continue this on and off for 1 to 2 days. · When lying down, place a small rolled towel under your scrotum.  When moving around, wear a jockstrap (athletic

## 2018-03-27 NOTE — PROGRESS NOTES
Patient ID: Otto Conway is a 63year old male. Patient presents with:  Physical       HISTORY OF PRESENT ILLNESS:   HPI  Patient presents for above. Here for annual physical and to discuss multiple medical issues.   Did labs through work in November Diagnosis Date   • [facility]    Colonoscopy    • Hiatal hernia 6-24-14   • Hyperlipidemia    • Ileitis 6-24-14   • Lipid screening 05/24/2012   • Pneumonia 1973   • Unspecified essential hypertension    • Varicella zoster        Past Surgi Smoking status: Never Smoker    Smokeless tobacco: Never Used    Alcohol use No    Comment: Rarely; beer and liquor     Drug use: No    Sexual activity: Not on file     Other Topics Concern    Caffeine Concern Yes    Comment: Daily; one cup, tea      Soci 17 - 63 U/L 32   AST (SGOT)      15 - 41 U/L 31   ALKALINE PHOSPHATASE      32 - 100 U/L 63   Total Bilirubin      0.3 - 1.2 mg/dL 1.6 (H)   TOTAL PROTEIN      5.9 - 8.4 g/dL 6.9   Albumin      3.5 - 4.8 g/dL 4.2   GLOBULIN, TOTAL      2.5 - 3.7 g/dL 2 1 - 149 mg/dL 122   NON HDL CHOL      <130 mg/dL 87   LDL Cholesterol Calc      0 - 99 mg/dL 63   PSA      0.0 - 4.0 ng/mL 1.8          ASSESSMENT/PLAN:   1. Annual physical exam  · TSH W REFLEX TO FREE T4; Future  · Other pertinent labs as above.   · F

## 2018-03-30 ENCOUNTER — HOSPITAL ENCOUNTER (OUTPATIENT)
Dept: ULTRASOUND IMAGING | Facility: HOSPITAL | Age: 58
Discharge: HOME | End: 2018-03-30
Attending: INTERNAL MEDICINE
Payer: COMMERCIAL

## 2018-03-30 DIAGNOSIS — N50.812 TESTICULAR PAIN, LEFT: ICD-10-CM

## 2018-03-30 PROCEDURE — 76870 US EXAM SCROTUM: CPT | Performed by: INTERNAL MEDICINE

## 2018-03-30 PROCEDURE — 93975 VASCULAR STUDY: CPT | Performed by: INTERNAL MEDICINE

## 2018-05-07 ENCOUNTER — OFFICE VISIT (OUTPATIENT)
Dept: SURGERY | Facility: CLINIC | Age: 58
End: 2018-05-07

## 2018-05-07 VITALS
BODY MASS INDEX: 27.2 KG/M2 | HEIGHT: 70 IN | HEART RATE: 69 BPM | TEMPERATURE: 98 F | SYSTOLIC BLOOD PRESSURE: 130 MMHG | WEIGHT: 190 LBS | DIASTOLIC BLOOD PRESSURE: 79 MMHG

## 2018-05-07 DIAGNOSIS — N50.82 SCROTAL PAIN: Primary | ICD-10-CM

## 2018-05-07 PROCEDURE — 99212 OFFICE O/P EST SF 10 MIN: CPT | Performed by: UROLOGY

## 2018-05-07 PROCEDURE — 99244 OFF/OP CNSLTJ NEW/EST MOD 40: CPT | Performed by: UROLOGY

## 2018-05-07 NOTE — PROGRESS NOTES
SUBJECTIVE:  Katey Meredith is a 63year old male who presents for a consultation at the request of, and a copy of this note will be sent to, dr. Joycelyn Allan, for evaluation of  Left scrotal pain. HAs had this intermittently for many ears.   Pain exacerbated by Mesfin Hunter Father 80     Lung Cancer   • Heart Disease Brother      Coronary Artery Disease       Social History: Smoking status: Never Smoker                                                              Smokeless tobacco: Never Used diagnosis)    No orders of the defined types were placed in this encounter. Reviewed findings at length with patient. Scrotal ultrasound performed in March shows a moderate left-sided varicocele and a mild left-sided hydrocele.   I do not believe both of

## 2018-05-23 ENCOUNTER — HOSPITAL ENCOUNTER (OUTPATIENT)
Age: 58
Discharge: HOME | End: 2018-05-23
Payer: COMMERCIAL

## 2018-05-23 VITALS
TEMPERATURE: 98 F | OXYGEN SATURATION: 100 % | SYSTOLIC BLOOD PRESSURE: 132 MMHG | RESPIRATION RATE: 18 BRPM | WEIGHT: 185 LBS | BODY MASS INDEX: 26.48 KG/M2 | HEART RATE: 61 BPM | HEIGHT: 70 IN | DIASTOLIC BLOOD PRESSURE: 73 MMHG

## 2018-05-23 DIAGNOSIS — M54.32 LEFT SIDED SCIATICA: Primary | ICD-10-CM

## 2018-05-23 PROCEDURE — 99213 OFFICE O/P EST LOW 20 MIN: CPT

## 2018-05-23 PROCEDURE — 99214 OFFICE O/P EST MOD 30 MIN: CPT

## 2018-05-23 RX ORDER — METHYLPREDNISOLONE 4 MG/1
TABLET ORAL
Qty: 1 PACKAGE | Refills: 0 | Status: SHIPPED | OUTPATIENT
Start: 2018-05-23 | End: 2018-05-28

## 2018-05-23 RX ORDER — DIAZEPAM 5 MG/1
5 TABLET ORAL 3 TIMES DAILY PRN
Qty: 15 TABLET | Refills: 0 | Status: SHIPPED | OUTPATIENT
Start: 2018-05-23 | End: 2018-05-30 | Stop reason: ALTCHOICE

## 2018-05-23 RX ORDER — HYDROCODONE BITARTRATE AND ACETAMINOPHEN 5; 325 MG/1; MG/1
1 TABLET ORAL EVERY 4 HOURS PRN
Qty: 10 TABLET | Refills: 0 | Status: SHIPPED | OUTPATIENT
Start: 2018-05-23 | End: 2018-05-30 | Stop reason: ALTCHOICE

## 2018-05-23 NOTE — ED INITIAL ASSESSMENT (HPI)
left lower back pain radiating to left lower leg since Sunday, h/o sciatica, h/o chronic back pain, denies trauma or injury

## 2018-05-23 NOTE — ED PROVIDER NOTES
Patient Seen in: [facility]    History   No chief complaint on file.     Stated Complaint: back pain    HPI    Patient is a 72-year-old male with a history of low back pain and sciatica who presents for evaluation of left- Respiratory: Negative. Cardiovascular: Negative. Gastrointestinal: Negative. Genitourinary: Negative. Musculoskeletal: Positive for back pain. Positive for stated complaint: back pain  Other systems are as noted in HPI.   Constitutional an Vitals stable, patient appears nontoxic. Physical exam as above, consistent with left-sided sciatica. I do not feel x-rays are warranted at this time. Patient agrees to defer xrays. Medrol Dosepak dispensed.    Limit Motrin use while taking Medrol Dosep

## 2018-05-29 ENCOUNTER — NURSE TRIAGE (OUTPATIENT)
Dept: INTERNAL MEDICINE CLINIC | Facility: CLINIC | Age: 58
End: 2018-05-29

## 2018-05-29 NOTE — TELEPHONE ENCOUNTER
Action Requested: Summary for Provider     []  Critical Lab, Recommendations Needed  [x] Need Additional Advice  []   FYI    []   Need Orders  [x] Need Medications Sent to Pharmacy  []  Other     SUMMARY: Dr Rbuen Nguyen, patient c/o one sided sciatic pain in one

## 2018-05-29 NOTE — TELEPHONE ENCOUNTER
Patient notified and agreed to appt 9:10 tomorrow Jodee 63  Phone room will contact supervisor to add the patient

## 2018-05-29 NOTE — TELEPHONE ENCOUNTER
PT HURT BACK OVER THE WEEKEND AND WONDERING IF DR CAN PRESCRIBE HIM SOME MEDS FOR THE PAIN           PLEASE ADVISE

## 2018-05-30 ENCOUNTER — TELEPHONE (OUTPATIENT)
Dept: INTERNAL MEDICINE CLINIC | Facility: CLINIC | Age: 58
End: 2018-05-30

## 2018-05-30 ENCOUNTER — OFFICE VISIT (OUTPATIENT)
Dept: INTERNAL MEDICINE CLINIC | Facility: CLINIC | Age: 58
End: 2018-05-30

## 2018-05-30 VITALS
WEIGHT: 189 LBS | SYSTOLIC BLOOD PRESSURE: 128 MMHG | DIASTOLIC BLOOD PRESSURE: 81 MMHG | HEART RATE: 61 BPM | HEIGHT: 70 IN | BODY MASS INDEX: 27.06 KG/M2 | TEMPERATURE: 98 F

## 2018-05-30 DIAGNOSIS — M54.32 SCIATICA OF LEFT SIDE: Primary | ICD-10-CM

## 2018-05-30 PROCEDURE — 99212 OFFICE O/P EST SF 10 MIN: CPT | Performed by: INTERNAL MEDICINE

## 2018-05-30 PROCEDURE — 99213 OFFICE O/P EST LOW 20 MIN: CPT | Performed by: INTERNAL MEDICINE

## 2018-05-30 RX ORDER — METHOCARBAMOL 500 MG/1
TABLET, FILM COATED ORAL 4 TIMES DAILY PRN
Qty: 60 TABLET | Refills: 1 | Status: SHIPPED | OUTPATIENT
Start: 2018-05-30 | End: 2019-05-08 | Stop reason: ALTCHOICE

## 2018-05-30 RX ORDER — PREDNISONE 10 MG/1
10 TABLET ORAL DAILY
Qty: 30 TABLET | Refills: 0 | Status: SHIPPED
Start: 2018-05-30 | End: 2019-02-14 | Stop reason: ALTCHOICE

## 2018-05-30 RX ORDER — METHOCARBAMOL 750 MG/1
750 TABLET, FILM COATED ORAL 3 TIMES DAILY PRN
Qty: 30 TABLET | Refills: 0 | Status: SHIPPED | OUTPATIENT
Start: 2018-05-30 | End: 2018-05-30 | Stop reason: ALTCHOICE

## 2018-05-30 NOTE — TELEPHONE ENCOUNTER
Dr Navid Davila, please advise, see new script above, complete. Called pharmacy, 500 mg is available.

## 2018-05-30 NOTE — PATIENT INSTRUCTIONS
Sciatica    Sciatica is a condition that causes pain in the lower back that spreads down into the buttock, hip, and leg. Sometimes the leg pain can happen without any back pain.  Sciatica happens when a spinal nerve is irritated or has pressure put on it · When in bed, try to find a position that is comfortable. A firm mattress is best. Try lying flat on your back with pillows under your knees. You can also try lying on your side with your knees bent up toward your chest and a pillow between your knees.   · © 9536-1372 [employer] [address]. [address] [facility] [place] [facility] [place]. All rights reserved. This information is not intended as a substitute for professional medical care. Always follow your healthcare professional's instructions.         Exercis 3. Abdominal contraction: Bend your knees and put your hands on your stomach. Tighten your stomach muscles. Hold for 5 seconds, then relax. Repeat 10 times. 4. Straight leg raise: Bend one leg at the knee and keep the other leg straight.  Tighten your stom 3. Pelvic tilt: Lie on the floor on your back with your knees bent at 90 degrees. Your feet should be flat on the floor. Inhale, exhale, then slowly contract your abdominal muscles bringing your navel toward your spine.  Let your pelvis rock back until your

## 2018-05-30 NOTE — PROGRESS NOTES
Patient ID: Matilde Dow is a 63year old male. Patient presents with:  Back Pain: x10 days, unsure of how it was injured but it happened while working around the house       HISTORY OF PRESENT ILLNESS:   HPI  Patient presents for above.   Here with lo Take 1 tablet (10 mg total) by mouth daily.  PREDNISONE TAPER: Take 40mg for 3 days, then Take 30mg for 3 days, then Take 20mg for 3 days, then Take 10mg for 3 days, then STOP!, Disp: 30 tablet, Rfl: 0  •  methocarbamol 750 MG Oral Tab, Take 1 tablet (750 m 27.12 kg/m². Physical Exam   Constitutional: He is oriented to person, place, and time. He appears well-developed and well-nourished. Cardiovascular: Normal rate, regular rhythm and normal heart sounds.     Pulmonary/Chest: Effort normal and breath alethea

## 2018-05-30 NOTE — TELEPHONE ENCOUNTER
Pharmacy called and states   methocarbamol 750 MG Oral Tab 30 tablet 0 5/30/2018     Sig - Route: Take 1 tablet (750 mg total) by mouth 3 (three) times daily as needed.  - Oral    E-Prescribing Status: Receipt confirmed by pharmacy (5/30/2018  9:17 AM CDT)

## 2018-06-18 RX ORDER — MESALAMINE 500 MG/1
CAPSULE ORAL
Qty: 540 CAPSULE | Refills: 0 | Status: SHIPPED | OUTPATIENT
Start: 2018-06-18 | End: 2018-07-12

## 2018-06-18 NOTE — TELEPHONE ENCOUNTER
See refill request  Patient last seen 3-23-17.    Medication last refilled 3-23-17    Pending Prescriptions Disp Refills    PENTASA 500 MG Oral Cap CR [Pharmacy Med Name: PENTASA CR CAPS 500MG] 540 capsule 3     Sig: TAKE 3 CAPSULES TWICE A DAY

## 2018-06-18 NOTE — TELEPHONE ENCOUNTER
Please contact Sami Robles. I have refilled his prescription ×1. He should been seen in the office in follow-up at his convenience.

## 2018-06-22 RX ORDER — NEBIVOLOL HYDROCHLORIDE 5 MG/1
TABLET ORAL
Qty: 90 TABLET | Refills: 0 | Status: SHIPPED | OUTPATIENT
Start: 2018-06-22 | End: 2018-09-23

## 2018-06-22 RX ORDER — EZETIMIBE AND SIMVASTATIN 10; 20 MG/1; MG/1
TABLET ORAL
Qty: 90 TABLET | Refills: 0 | Status: SHIPPED | OUTPATIENT
Start: 2018-06-22 | End: 2018-09-23

## 2018-06-23 NOTE — TELEPHONE ENCOUNTER
Cholesterol Medications  Protocol Criteria:  · Appointment scheduled in the past 12 months or in the next 3 months  · ALT & LDL on file in the past 12 months  · ALT result < 80  · LDL result <130   Recent Outpatient Visits            3 weeks ago Sciatica o Appointments       Provider Department Appt Notes    In 2 weeks Soha Harman Letters, [address] [facility], [address] [place] [address], [facility] annual check up           Lab Results  Component Value Date   GLU 97 11/09/2017   BUN 16 11/09/2017   CREATSERUM 1.29 11/09/

## 2018-07-09 ENCOUNTER — OFFICE VISIT (OUTPATIENT)
Dept: GASTROENTEROLOGY | Facility: CLINIC | Age: 58
End: 2018-07-09

## 2018-07-09 VITALS
HEIGHT: 70 IN | HEART RATE: 65 BPM | BODY MASS INDEX: 27.49 KG/M2 | WEIGHT: 192 LBS | DIASTOLIC BLOOD PRESSURE: 83 MMHG | SYSTOLIC BLOOD PRESSURE: 146 MMHG

## 2018-07-09 DIAGNOSIS — K50.00 CROHN'S DISEASE OF SMALL INTESTINE WITHOUT COMPLICATION (HCC): Primary | ICD-10-CM

## 2018-07-09 PROCEDURE — 99213 OFFICE O/P EST LOW 20 MIN: CPT | Performed by: INTERNAL MEDICINE

## 2018-07-09 PROCEDURE — 99212 OFFICE O/P EST SF 10 MIN: CPT | Performed by: INTERNAL MEDICINE

## 2018-07-10 NOTE — PATIENT INSTRUCTIONS
1.  Continue Pentasa. 2.  Obtain blood work once yearly through your primary care physician. 3.  Avoid anti-inflammatory medications as much as possible. 4.  Schedule an upper endoscopy for history of a lymphocytic gastritis.   5.  Follow-up in the offic

## 2018-07-10 NOTE — PROGRESS NOTES
HPI:    Patient ID: Maurice Pelletier is a 63year old male. HPI  Sharlene Zurita returns in follow-up. He was last seen in March 2017. As per previous notes he has over a 20 year history of short segment (7 cm) ileal Crohn's disease.  He has been maintained on me A DAY Disp: 540 capsule Rfl: 0   ondansetron 4 MG Oral Tablet Dispersible Take 1 tablet (4 mg total) by mouth every 8 (eight) hours as needed for Nausea.  Disp: 20 tablet Rfl: 0   Pantoprazole Sodium 40 MG Oral Tab EC Take 1 tablet (40 mg total) by mouth 2 vitals reviewed.     Component      Latest Ref Rng & Units 11/9/2017   Glucose      70 - 99 mg/dL 97   Sodium      136 - 144 mmol/L 141   Potassium      3.3 - 5.1 mmol/L 4.3   Chloride      95 - 110 mmol/L 103   Carbon Dioxide, Total      22 - 32 mmol/L 30 should be performed yearly through his primary care physician. The patient was asked to contact me with any changes. Follow-up office visit in 1 year was advised. History of lymphocytic gastritis  Currently asymptomatic.   I am recommending a follow-up

## 2018-07-12 NOTE — TELEPHONE ENCOUNTER
I called [facility] to verify amount dispensed, per Kd they dispensed a 90 day supply on 6/19/18. Pt should have enough medication for another month.

## 2018-07-12 NOTE — TELEPHONE ENCOUNTER
Pending Prescriptions Disp Refills    Mesalamine ER (PENTASA) 500 MG Oral Cap  capsule 0       LOV 7/09/18  LR 6/18/18     em

## 2018-09-11 ENCOUNTER — TELEPHONE (OUTPATIENT)
Dept: GASTROENTEROLOGY | Facility: CLINIC | Age: 58
End: 2018-09-11

## 2018-09-11 DIAGNOSIS — K29.60 LYMPHOCYTIC GASTRITIS: Primary | ICD-10-CM

## 2018-09-11 NOTE — TELEPHONE ENCOUNTER
Scheduled for:  EGD 73534  Provider Name: Dr. Vonnie Moura  Date:  11/6/18  Location:  [facility]  Sedation:  IV  Time:  7118 (pt is aware to arrive at 0915)   Prep:  NPO after midnight  Meds/Allergies Reconciled?:  Physician reviewed   Diagnosis with codes:  Lymphocytic g

## 2018-09-24 RX ORDER — NEBIVOLOL HYDROCHLORIDE 5 MG/1
TABLET ORAL
Qty: 90 TABLET | Refills: 0 | Status: SHIPPED | OUTPATIENT
Start: 2018-09-24 | End: 2018-12-19

## 2018-09-24 RX ORDER — EZETIMIBE AND SIMVASTATIN 10; 20 MG/1; MG/1
TABLET ORAL
Qty: 90 TABLET | Refills: 0 | Status: SHIPPED | OUTPATIENT
Start: 2018-09-24 | End: 2018-12-19

## 2018-09-25 NOTE — TELEPHONE ENCOUNTER
Hypertensive Medications  Protocol Criteria:  · Appointment scheduled in the past 6 months or in the next 3 months  · BMP or CMP in the past 12 months  · Creatinine result < 2  Recent Outpatient Visits            2 months ago Crohn's disease of small intes side    Urszula Tse, Elva Donald MD    Office Visit    4 months ago Scrotal pain    [facility] for [place], Tereso Souza [place]    Office Visit    6 months ago Annual physical exam    Sera Ochoa

## 2018-10-10 ENCOUNTER — OFFICE VISIT (OUTPATIENT)
Dept: OTHER | Facility: HOSPITAL | Age: 58
End: 2018-10-10
Attending: EMERGENCY MEDICINE

## 2018-10-10 DIAGNOSIS — Z00.00 ROUTINE GENERAL MEDICAL EXAMINATION AT A HEALTH CARE FACILITY: Primary | ICD-10-CM

## 2018-10-10 PROCEDURE — 80061 LIPID PANEL: CPT

## 2018-10-10 PROCEDURE — 80053 COMPREHEN METABOLIC PANEL: CPT

## 2018-10-10 PROCEDURE — 85025 COMPLETE CBC W/AUTO DIFF WBC: CPT

## 2018-10-10 PROCEDURE — 81003 URINALYSIS AUTO W/O SCOPE: CPT

## 2018-10-19 ENCOUNTER — OFFICE VISIT (OUTPATIENT)
Dept: OTHER | Facility: HOSPITAL | Age: 58
End: 2018-10-19
Attending: EMERGENCY MEDICINE

## 2018-10-19 DIAGNOSIS — Z00.00 ROUTINE GENERAL MEDICAL EXAMINATION AT A HEALTH CARE FACILITY: Primary | ICD-10-CM

## 2018-10-19 PROCEDURE — 84153 ASSAY OF PSA TOTAL: CPT

## 2018-10-29 ENCOUNTER — HOSPITAL ENCOUNTER (OUTPATIENT)
Dept: CT IMAGING | Facility: HOSPITAL | Age: 58
Discharge: HOME | End: 2018-10-29
Attending: INTERNAL MEDICINE
Payer: COMMERCIAL

## 2018-10-29 VITALS
SYSTOLIC BLOOD PRESSURE: 120 MMHG | DIASTOLIC BLOOD PRESSURE: 72 MMHG | HEART RATE: 53 BPM | OXYGEN SATURATION: 100 % | WEIGHT: 192 LBS | HEIGHT: 70 IN | RESPIRATION RATE: 16 BRPM | BODY MASS INDEX: 27.49 KG/M2

## 2018-10-29 DIAGNOSIS — R07.9 CHEST PAIN, UNSPECIFIED TYPE: ICD-10-CM

## 2018-10-29 PROCEDURE — 82565 ASSAY OF CREATININE: CPT

## 2018-10-29 PROCEDURE — 75574 CT ANGIO HRT W/3D IMAGE: CPT | Performed by: INTERNAL MEDICINE

## 2018-10-29 RX ORDER — NITROGLYCERIN 0.4 MG/1
TABLET SUBLINGUAL
Status: COMPLETED
Start: 2018-10-29 | End: 2018-10-29

## 2018-10-29 RX ORDER — METOPROLOL TARTRATE 5 MG/5ML
5 INJECTION INTRAVENOUS SEE ADMIN INSTRUCTIONS
Status: ACTIVE | OUTPATIENT
Start: 2018-10-29 | End: 2018-10-29

## 2018-10-29 RX ORDER — DILTIAZEM HYDROCHLORIDE 5 MG/ML
10 INJECTION INTRAVENOUS SEE ADMIN INSTRUCTIONS
Status: ACTIVE | OUTPATIENT
Start: 2018-10-29 | End: 2018-10-29

## 2018-10-29 RX ORDER — NITROGLYCERIN 0.4 MG/1
0.4 TABLET SUBLINGUAL ONCE
Status: COMPLETED | OUTPATIENT
Start: 2018-10-29 | End: 2018-10-29

## 2018-10-29 RX ORDER — METOPROLOL TARTRATE 5 MG/5ML
INJECTION INTRAVENOUS
Status: COMPLETED
Start: 2018-10-29 | End: 2018-10-29

## 2018-10-29 RX ADMIN — METOPROLOL TARTRATE 5 MG: 5 INJECTION INTRAVENOUS at 09:33:00

## 2018-10-29 RX ADMIN — Medication 100 MG: at 08:15:00

## 2018-10-29 RX ADMIN — METOPROLOL TARTRATE 5 MG: 5 INJECTION INTRAVENOUS at 09:28:00

## 2018-10-29 RX ADMIN — NITROGLYCERIN 0.4 MG: 0.4 TABLET SUBLINGUAL at 09:22:00

## 2018-10-29 NOTE — IMAGING NOTE
T O RAD HOLDING AT 0800  HX TAKEN PT CONSENTED AT 0810  VS     18 GAUGE IV STARTED AT 0837  POC TESTING COMPLETED GFR = > 60   CREATINE = 1.1    CTA ORDERED BY DR. Malcom Delong WAS PT GIVEN CTA  NO     HR 72  /78    METOPROLOL PO GIVEN  100 MILLIGRAMS  MG

## 2018-11-02 ENCOUNTER — TELEPHONE (OUTPATIENT)
Dept: GASTROENTEROLOGY | Facility: CLINIC | Age: 58
End: 2018-11-02

## 2018-11-02 NOTE — TELEPHONE ENCOUNTER
[employer] message sent with EGD instructions for CLN 11/6/18 arrival time 9:15AM    Left message on pts  home #  (okay per FYNAHUM in [facility] verbal release to leave detailed message) that EGD instructions have been sent to his [employer].

## 2018-11-02 NOTE — TELEPHONE ENCOUNTER
Pt has upcoming CLN jany 11/06/18 and indicates instructions have been misplaced, pt asking if they can be sent to his [employer], or call at:[phone],thanks.

## 2018-11-06 ENCOUNTER — HOSPITAL ENCOUNTER (OUTPATIENT)
Facility: HOSPITAL | Age: 58
Setting detail: HOSPITAL OUTPATIENT SURGERY
Discharge: HOME | End: 2018-11-06
Attending: INTERNAL MEDICINE | Admitting: INTERNAL MEDICINE
Payer: COMMERCIAL

## 2018-11-06 DIAGNOSIS — K29.60 LYMPHOCYTIC GASTRITIS: ICD-10-CM

## 2018-11-06 PROCEDURE — 0DB68ZX EXCISION OF STOMACH, VIA NATURAL OR ARTIFICIAL OPENING ENDOSCOPIC, DIAGNOSTIC: ICD-10-PCS | Performed by: INTERNAL MEDICINE

## 2018-11-06 PROCEDURE — 88305 TISSUE EXAM BY PATHOLOGIST: CPT | Performed by: INTERNAL MEDICINE

## 2018-11-06 PROCEDURE — 88312 SPECIAL STAINS GROUP 1: CPT | Performed by: INTERNAL MEDICINE

## 2018-11-06 PROCEDURE — 99152 MOD SED SAME PHYS/QHP 5/>YRS: CPT | Performed by: INTERNAL MEDICINE

## 2018-11-06 RX ORDER — SODIUM CHLORIDE 0.9 % (FLUSH) 0.9 %
10 SYRINGE (ML) INJECTION AS NEEDED
Status: DISCONTINUED | OUTPATIENT
Start: 2018-11-06 | End: 2018-11-06

## 2018-11-06 RX ORDER — MIDAZOLAM HYDROCHLORIDE 1 MG/ML
1 INJECTION INTRAMUSCULAR; INTRAVENOUS EVERY 5 MIN PRN
Status: DISCONTINUED | OUTPATIENT
Start: 2018-11-06 | End: 2018-11-06

## 2018-11-06 RX ORDER — SODIUM CHLORIDE, SODIUM LACTATE, POTASSIUM CHLORIDE, CALCIUM CHLORIDE 600; 310; 30; 20 MG/100ML; MG/100ML; MG/100ML; MG/100ML
INJECTION, SOLUTION INTRAVENOUS CONTINUOUS
Status: DISCONTINUED | OUTPATIENT
Start: 2018-11-06 | End: 2018-11-06

## 2018-11-06 RX ORDER — MIDAZOLAM HYDROCHLORIDE 1 MG/ML
INJECTION INTRAMUSCULAR; INTRAVENOUS
Status: DISCONTINUED | OUTPATIENT
Start: 2018-11-06 | End: 2018-11-06

## 2018-11-06 NOTE — OPERATIVE REPORT
[facility] Endoscopy Report      Date of Procedure:  11/06/18        Preoperative Diagnosis:  1. History of lymphocytic gastritis  2. Crohn's disease      Postoperative Diagnosis:  1.   Multiple persistent gastric nodules as described Kiara Cordova were obtained from the proximal greater curvature and the mid/distal greater curvature and placed in separate containers.   The remainder of the gastric mucosa including antrum and cardia/fundus appeared normal.  The duodenal bulb and post bulbar regions we

## 2018-11-06 NOTE — H&P
History & Physical Examination    Patient Name: Leelee Espinosa  MRN: [id number]  CSN: [id number]  YOB: 1960    Diagnosis: Lymphocytic gastritis and history of Crohn's disease        Medications Prior to Admission:  BYSTOLIC 5 MG Oral Tab TAKE Medical History:   Diagnosis Date   • Crohn disease ([facility]    • Crohn's disease ([facility] 2001    Colonoscopy    • Hiatal hernia 6-24-14   • High blood pressure    • High cholesterol    • Hyperlipidemia    • Ileitis 6-24-14   • Lipid screening 05/24/2012   • Pn

## 2018-11-07 VITALS
RESPIRATION RATE: 18 BRPM | SYSTOLIC BLOOD PRESSURE: 107 MMHG | BODY MASS INDEX: 26.48 KG/M2 | HEART RATE: 67 BPM | DIASTOLIC BLOOD PRESSURE: 67 MMHG | HEIGHT: 70 IN | OXYGEN SATURATION: 96 % | WEIGHT: 185 LBS

## 2018-11-12 ENCOUNTER — TELEPHONE (OUTPATIENT)
Dept: GASTROENTEROLOGY | Facility: CLINIC | Age: 58
End: 2018-11-12

## 2018-11-12 NOTE — TELEPHONE ENCOUNTER
----- Message from Alfredo Vyas MD sent at 11/9/2018  6:09 PM CST -----  I spoke to the patient did today as he accompanied his wife to endoscopy. The biopsy showed no worrisome changes. The endoscopic appearance was stable to improved.   I have r

## 2018-11-12 NOTE — TELEPHONE ENCOUNTER
Entered into Epic:Recall EGD in 3 years per Dr. Omar Finley. Last EGD done 11/6/18, next due 11/6/21. Snapshot updated.

## 2018-11-13 ENCOUNTER — HOSPITAL ENCOUNTER (OUTPATIENT)
Age: 58
Discharge: HOME | End: 2018-11-13
Attending: EMERGENCY MEDICINE
Payer: COMMERCIAL

## 2018-11-13 VITALS
DIASTOLIC BLOOD PRESSURE: 73 MMHG | HEART RATE: 72 BPM | TEMPERATURE: 98 F | HEIGHT: 70 IN | BODY MASS INDEX: 26.48 KG/M2 | RESPIRATION RATE: 18 BRPM | WEIGHT: 185 LBS | OXYGEN SATURATION: 96 % | SYSTOLIC BLOOD PRESSURE: 136 MMHG

## 2018-11-13 DIAGNOSIS — S05.02XA ABRASION OF LEFT CORNEA, INITIAL ENCOUNTER: Primary | ICD-10-CM

## 2018-11-13 PROCEDURE — 99213 OFFICE O/P EST LOW 20 MIN: CPT

## 2018-11-13 PROCEDURE — 99214 OFFICE O/P EST MOD 30 MIN: CPT

## 2018-11-13 RX ORDER — OFLOXACIN 3 MG/ML
1 SOLUTION/ DROPS OPHTHALMIC 4 TIMES DAILY
Qty: 1 BOTTLE | Refills: 0 | Status: SHIPPED | OUTPATIENT
Start: 2018-11-13 | End: 2018-11-20

## 2018-11-13 NOTE — ED INITIAL ASSESSMENT (HPI)
PATIENT ARRIVED AMBULATORY TO ROOM C/O REDNESS AND IRRITATION TO THE LEFT EYE STARTED YESTERDAY. NO DRAINAGE.  PATIENT STATES \"I WAS WORKING ON MY HOUSE YESTERDAY BUT I WAS WEARING MY GLASSES AND SAFETY GOGGLES SO I CAN'T IMAGINE ANYTHING GOT IN THERE\" PA

## 2018-11-13 NOTE — ED PROVIDER NOTES
Patient Seen in: [facility]    History   Patient presents with:  Eye Problem    Stated Complaint: Eye problem    HPI    Patient complains of left eye redness and irritation.   Patient states he was performing some work on change in vision  Neurologic denies dizziness      All other systems reviewed and negative except as noted above.     Physical Exam     ED Triage Vitals [11/13/18 0823]   /73   Pulse 72   Resp 18   Temp 98.2 °F (36.8 °C)   Temp src Oral   SpO2 96 %

## 2018-11-28 ENCOUNTER — OFFICE VISIT (OUTPATIENT)
Dept: INTERNAL MEDICINE CLINIC | Facility: CLINIC | Age: 58
End: 2018-11-28
Payer: COMMERCIAL

## 2018-11-28 VITALS
WEIGHT: 192 LBS | HEART RATE: 78 BPM | HEIGHT: 70 IN | TEMPERATURE: 99 F | SYSTOLIC BLOOD PRESSURE: 118 MMHG | DIASTOLIC BLOOD PRESSURE: 75 MMHG | BODY MASS INDEX: 27.49 KG/M2

## 2018-11-28 DIAGNOSIS — K76.0 FATTY LIVER: Primary | ICD-10-CM

## 2018-11-28 PROCEDURE — 99212 OFFICE O/P EST SF 10 MIN: CPT | Performed by: INTERNAL MEDICINE

## 2018-11-28 PROCEDURE — 99214 OFFICE O/P EST MOD 30 MIN: CPT | Performed by: INTERNAL MEDICINE

## 2018-11-28 NOTE — PROGRESS NOTES
Patient ID: Paris Barron is a 63year old male. Patient presents with:  Lab Results       HISTORY OF PRESENT ILLNESS:   HPI  Patient presents for above. Here for follow-up of labs and CT scan done at work.   Had a CT calcium scoring done which was nor Mesalamine ER (PENTASA) 500 MG Oral Cap CR, Take 3 capsules (1,500 mg total) by mouth 2 (two) times daily. , Disp: 540 capsule, Rfl: 0  •  predniSONE 10 MG Oral Tab, Take 1 tablet (10 mg total) by mouth daily.  PREDNISONE TAPER: Take 40mg for 3 days, then Ta Asked        Blood Transfusions: Not Asked        Caffeine Concern: Yes          Daily; one cup, tea         Occupational Exposure: Not Asked        Hobby Hazards: Not Asked        Sleep Concern: Not Asked        Stress Concern: Not Asked        Weight Con OSMOLALITY      275 - 295 mOsm/kg   290    GFR, Non-      >=60   >60    GFR, -American      >=60   >60    Patient Fasting?          Yes    WBC      4.0 - 11.0 K/UL    4.9   RBC      4.50 - 5.90 M/UL    4.92   Hemoglobin      13.5 - 17 BUN      8 - 20 mg/dL    CREATININE      0.50 - 1.50 mg/dL    CALCIUM      8.5 - 10.5 mg/dL    ALT (SGPT)      17 - 63 U/L    AST (SGOT)      15 - 41 U/L    ALKALINE PHOSPHATASE      32 - 100 U/L    Total Bilirubin      0.3 - 1.2 mg/dL    TOTAL PROTEIN NON HDL CHOL      <130 mg/dL    LDL Cholesterol Calc      0 - 99 mg/dL    PSA      0.0 - 4.0 ng/mL          ASSESSMENT/PLAN:   1. Fatty liver  · Incidental finding. · Labs are normal.  · Okay to continue present medications.   · Discussed refraining from

## 2018-12-20 RX ORDER — EZETIMIBE AND SIMVASTATIN 10; 20 MG/1; MG/1
TABLET ORAL
Qty: 90 TABLET | Refills: 0 | Status: SHIPPED | OUTPATIENT
Start: 2018-12-20 | End: 2019-03-20

## 2018-12-20 RX ORDER — NEBIVOLOL HYDROCHLORIDE 5 MG/1
TABLET ORAL
Qty: 90 TABLET | Refills: 0 | Status: SHIPPED | OUTPATIENT
Start: 2018-12-20 | End: 2019-03-20

## 2019-02-14 ENCOUNTER — TELEPHONE (OUTPATIENT)
Dept: INTERNAL MEDICINE CLINIC | Facility: CLINIC | Age: 59
End: 2019-02-14

## 2019-02-14 ENCOUNTER — HOSPITAL ENCOUNTER (OUTPATIENT)
Dept: GENERAL RADIOLOGY | Age: 59
Discharge: HOME | End: 2019-02-14
Attending: INTERNAL MEDICINE
Payer: COMMERCIAL

## 2019-02-14 ENCOUNTER — OFFICE VISIT (OUTPATIENT)
Dept: INTERNAL MEDICINE CLINIC | Facility: CLINIC | Age: 59
End: 2019-02-14
Payer: COMMERCIAL

## 2019-02-14 VITALS
WEIGHT: 193 LBS | DIASTOLIC BLOOD PRESSURE: 74 MMHG | TEMPERATURE: 98 F | SYSTOLIC BLOOD PRESSURE: 147 MMHG | BODY MASS INDEX: 27.63 KG/M2 | HEART RATE: 67 BPM | HEIGHT: 70 IN

## 2019-02-14 DIAGNOSIS — M79.672 LEFT FOOT PAIN: Primary | ICD-10-CM

## 2019-02-14 DIAGNOSIS — M79.672 LEFT FOOT PAIN: ICD-10-CM

## 2019-02-14 PROCEDURE — 99212 OFFICE O/P EST SF 10 MIN: CPT | Performed by: INTERNAL MEDICINE

## 2019-02-14 PROCEDURE — 99213 OFFICE O/P EST LOW 20 MIN: CPT | Performed by: INTERNAL MEDICINE

## 2019-02-14 PROCEDURE — 73630 X-RAY EXAM OF FOOT: CPT | Performed by: INTERNAL MEDICINE

## 2019-03-20 RX ORDER — NEBIVOLOL HYDROCHLORIDE 5 MG/1
TABLET ORAL
Qty: 90 TABLET | Refills: 0 | Status: SHIPPED | OUTPATIENT
Start: 2019-03-20 | End: 2019-06-18

## 2019-03-20 RX ORDER — EZETIMIBE AND SIMVASTATIN 10; 20 MG/1; MG/1
TABLET ORAL
Qty: 90 TABLET | Refills: 0 | Status: SHIPPED | OUTPATIENT
Start: 2019-03-20 | End: 2019-06-18

## 2019-05-08 ENCOUNTER — OFFICE VISIT (OUTPATIENT)
Dept: INTERNAL MEDICINE CLINIC | Facility: CLINIC | Age: 59
End: 2019-05-08
Payer: COMMERCIAL

## 2019-05-08 VITALS
DIASTOLIC BLOOD PRESSURE: 80 MMHG | HEART RATE: 74 BPM | SYSTOLIC BLOOD PRESSURE: 130 MMHG | HEIGHT: 70 IN | TEMPERATURE: 98 F | BODY MASS INDEX: 27.06 KG/M2 | WEIGHT: 189 LBS

## 2019-05-08 DIAGNOSIS — Z00.00 ANNUAL PHYSICAL EXAM: Primary | ICD-10-CM

## 2019-05-08 DIAGNOSIS — Z12.11 COLON CANCER SCREENING: ICD-10-CM

## 2019-05-08 DIAGNOSIS — K50.00 CROHN'S DISEASE INVOLVING TERMINAL ILEUM (HCC): ICD-10-CM

## 2019-05-08 DIAGNOSIS — I10 ESSENTIAL HYPERTENSION: ICD-10-CM

## 2019-05-08 DIAGNOSIS — K76.0 FATTY LIVER: ICD-10-CM

## 2019-05-08 PROCEDURE — 99396 PREV VISIT EST AGE 40-64: CPT | Performed by: INTERNAL MEDICINE

## 2019-05-08 NOTE — PROGRESS NOTES
Patient ID: Jigar Rodriguez is a 63year old male. Patient presents with:  Physical       HISTORY OF PRESENT ILLNESS:   HPI  Patient presents for above.   Here for annual physical and to discuss multiple medical issues.     History of high blood pressure TONSILLECTOMY  1986   • UPPER GI ENDOSCOPY,BIOPSY  6-24-14         Current Outpatient Medications:   •  BYSTOLIC 5 MG Oral Tab, TAKE 1 TABLET DAILY, Disp: 90 tablet, Rfl: 0  •  EZETIMIBE-SIMVASTATIN 10-20 MG Oral Tab, TAKE 1 TABLET NIGHTLY AT BEDTIME, Disp connections:        Talks on phone: Not on file        Gets together: Not on file        Attends Rastafarian service: Not on file        Active member of club or organization: Not on file        Attends meetings of clubs or organizations: Not on file person, place, and time. Skin: Skin is warm. Psychiatric: He has a normal mood and affect. His behavior is normal.         ASSESSMENT/PLAN:   1. Annual physical exam  · CBC WITH DIFFERENTIAL WITH PLATELET; Future  · COMP METABOLIC PANEL (14);  Future  ·

## 2019-05-10 ENCOUNTER — LAB ENCOUNTER (OUTPATIENT)
Dept: LAB | Age: 59
End: 2019-05-10
Attending: INTERNAL MEDICINE
Payer: COMMERCIAL

## 2019-05-10 DIAGNOSIS — K76.0 FATTY LIVER: ICD-10-CM

## 2019-05-10 DIAGNOSIS — Z00.00 ANNUAL PHYSICAL EXAM: ICD-10-CM

## 2019-05-10 PROCEDURE — 80061 LIPID PANEL: CPT

## 2019-05-10 PROCEDURE — 85025 COMPLETE CBC W/AUTO DIFF WBC: CPT

## 2019-05-10 PROCEDURE — 36415 COLL VENOUS BLD VENIPUNCTURE: CPT

## 2019-05-10 PROCEDURE — 84443 ASSAY THYROID STIM HORMONE: CPT

## 2019-05-10 PROCEDURE — 84153 ASSAY OF PSA TOTAL: CPT

## 2019-05-10 PROCEDURE — 80053 COMPREHEN METABOLIC PANEL: CPT

## 2019-06-18 RX ORDER — NEBIVOLOL HYDROCHLORIDE 5 MG/1
TABLET ORAL
Qty: 90 TABLET | Refills: 1 | Status: SHIPPED | OUTPATIENT
Start: 2019-06-18 | End: 2019-11-08

## 2019-06-18 RX ORDER — EZETIMIBE AND SIMVASTATIN 10; 20 MG/1; MG/1
TABLET ORAL
Qty: 90 TABLET | Refills: 1 | Status: SHIPPED | OUTPATIENT
Start: 2019-06-18 | End: 2019-11-08

## 2019-06-19 NOTE — TELEPHONE ENCOUNTER
Refill passed per [facility] protocol.   Cholesterol Medications  Protocol Criteria:  · Appointment scheduled in the past 12 months or in the next 3 months  · ALT & LDL on file in the past 12 months  · ALT result < 80  · LDL result <130   Recent Outpat Office Visit        Future Appointments       Provider Department Appt Notes    In 4 months Suorav Loomis MD [facility], [facility], [address] [facility] 6 mth         Lab Results   Component Value Date    GLU 82 05/10/2019    BUN 18 05/10/2019    CREATSERUM

## 2019-07-22 RX ORDER — MESALAMINE 500 MG/1
CAPSULE ORAL
Qty: 540 CAPSULE | Refills: 1 | Status: SHIPPED | OUTPATIENT
Start: 2019-07-22 | End: 2020-06-22

## 2019-07-22 NOTE — TELEPHONE ENCOUNTER
Requested Prescriptions     Pending Prescriptions Disp Refills   • PENTASA 500 MG Oral Cap CR [Pharmacy Med Name: PENTASA CR CAPS 500MG] 540 capsule 0     Sig: TAKE 3 CAPSULES (1,500 MG TOTAL) TWICE A DAY     LOV-11/6/18  LR-7/12/18

## 2019-10-03 ENCOUNTER — TELEPHONE (OUTPATIENT)
Dept: INTERNAL MEDICINE CLINIC | Facility: CLINIC | Age: 59
End: 2019-10-03

## 2019-10-03 ENCOUNTER — OFFICE VISIT (OUTPATIENT)
Dept: OTHER | Facility: HOSPITAL | Age: 59
End: 2019-10-03
Attending: FAMILY MEDICINE

## 2019-10-03 DIAGNOSIS — Z00.00 ANNUAL PHYSICAL EXAM: Primary | ICD-10-CM

## 2019-10-03 PROCEDURE — 84153 ASSAY OF PSA TOTAL: CPT

## 2019-10-03 PROCEDURE — 80053 COMPREHEN METABOLIC PANEL: CPT

## 2019-10-03 PROCEDURE — 80061 LIPID PANEL: CPT

## 2019-10-03 PROCEDURE — 81001 URINALYSIS AUTO W/SCOPE: CPT

## 2019-10-03 PROCEDURE — 85025 COMPLETE CBC W/AUTO DIFF WBC: CPT

## 2019-10-03 NOTE — TELEPHONE ENCOUNTER
Patient is requesting if Dr can take a look at the labs that were done today thru [facility] health please.

## 2019-10-04 NOTE — TELEPHONE ENCOUNTER
Patient notified of message below, verbalized understanding denied any questions at the time of call.

## 2019-10-09 ENCOUNTER — APPOINTMENT (OUTPATIENT)
Dept: OTHER | Facility: HOSPITAL | Age: 59
End: 2019-10-09
Attending: EMERGENCY MEDICINE

## 2019-11-08 ENCOUNTER — OFFICE VISIT (OUTPATIENT)
Dept: INTERNAL MEDICINE CLINIC | Facility: CLINIC | Age: 59
End: 2019-11-08
Payer: COMMERCIAL

## 2019-11-08 VITALS
SYSTOLIC BLOOD PRESSURE: 129 MMHG | WEIGHT: 194 LBS | BODY MASS INDEX: 27.77 KG/M2 | HEART RATE: 69 BPM | DIASTOLIC BLOOD PRESSURE: 78 MMHG | HEIGHT: 70 IN | TEMPERATURE: 98 F

## 2019-11-08 DIAGNOSIS — E78.00 HYPERCHOLESTEROLEMIA: Primary | ICD-10-CM

## 2019-11-08 DIAGNOSIS — I10 ESSENTIAL HYPERTENSION: ICD-10-CM

## 2019-11-08 PROCEDURE — 99214 OFFICE O/P EST MOD 30 MIN: CPT | Performed by: INTERNAL MEDICINE

## 2019-11-08 RX ORDER — NEBIVOLOL 5 MG/1
5 TABLET ORAL
Qty: 90 TABLET | Refills: 1 | Status: SHIPPED | OUTPATIENT
Start: 2019-11-08 | End: 2020-05-20

## 2019-11-08 RX ORDER — EZETIMIBE AND SIMVASTATIN 10; 20 MG/1; MG/1
TABLET ORAL
Qty: 90 TABLET | Refills: 1 | Status: SHIPPED | OUTPATIENT
Start: 2019-11-08 | End: 2020-05-20

## 2019-11-08 NOTE — PATIENT INSTRUCTIONS
Low-Salt Diet  This diet removes foods that are high in salt. It also limits the amount of salt you use when cooking. It is most often used for people with high blood pressure, edema (fluid retention), and kidney, liver, or heart disease.   Table salt conta coffees, electrolyte replacement drinks, sports drinks  Meats  Ok: All fresh meat, fish, poultry, low-salt tuna, eggs, egg substitute  Avoid: Smoked, pickled, brine-cured, or salted meats and fish.  This includes castillo, chipped beef, corned beef, hot dogs, flow to the heart and brain. This can cause a heart attack or stroke. The cholesterol in your blood comes from 2 sources: cholesterol in food that you eat and cholesterol that your liver makes. You should limit the amount of cholesterol in your diet.  But soybean, canola, peanut, and olive oils). · Avoid saturated fats found in animal products such as meat, dairy (whole-milk, cheese and ice cream), poultry skin, and egg yolks.  Plants high in saturated oils include coconut oil, palm oil, and palm kernel oil

## 2019-11-08 NOTE — PROGRESS NOTES
Patient ID: Megan Torre is a 63year old male. Patient presents with: Follow - Up: 6 month f/u        HISTORY OF PRESENT ILLNESS:   HPI  Patient presents for above. Here for routine six-month follow-up.     History of high cholesterol on medications AT BEDTIME, Disp: 90 tablet, Rfl: 1  •  PENTASA 500 MG Oral Cap CR, TAKE 3 CAPSULES (1,500 MG TOTAL) TWICE A DAY, Disp: 540 capsule, Rfl: 1  •  ondansetron 4 MG Oral Tablet Dispersible, Take 1 tablet (4 mg total) by mouth every 8 (eight) hours as needed fo status: Not on file      Intimate partner violence:        Fear of current or ex partner: Not on file        Emotionally abused: Not on file        Physically abused: Not on file        Forced sexual activity: Not on file    Other Topics      Concerns: excuse him from work on 11/3/2019.     Return in about 6 months (around 5/8/2020) for Complete physical.    Chris Osborne MD  11/8/2019

## 2020-05-19 DIAGNOSIS — E78.00 HYPERCHOLESTEROLEMIA: ICD-10-CM

## 2020-05-19 DIAGNOSIS — I10 ESSENTIAL HYPERTENSION: ICD-10-CM

## 2020-05-20 RX ORDER — EZETIMIBE AND SIMVASTATIN 10; 20 MG/1; MG/1
TABLET ORAL
Qty: 90 TABLET | Refills: 3 | Status: SHIPPED | OUTPATIENT
Start: 2020-05-20 | End: 2021-11-15

## 2020-05-20 RX ORDER — NEBIVOLOL HYDROCHLORIDE 5 MG/1
TABLET ORAL
Qty: 90 TABLET | Refills: 3 | Status: SHIPPED | OUTPATIENT
Start: 2020-05-20 | End: 2021-05-15

## 2020-06-22 RX ORDER — MESALAMINE 500 MG/1
CAPSULE ORAL
Qty: 540 CAPSULE | Refills: 0 | Status: SHIPPED | OUTPATIENT
Start: 2020-06-22 | End: 2021-02-02

## 2020-06-22 NOTE — TELEPHONE ENCOUNTER
Please contact the patient. I have refilled the Rx x 3 months. The patient should be seen in follow up for further refills.

## 2020-06-23 NOTE — TELEPHONE ENCOUNTER
Let detailed message from Dr. Dima Marie on patient's voicemail. When patient calls back, please assist in scheduling an appointment. Thank you.

## 2020-10-05 ENCOUNTER — TELEPHONE (OUTPATIENT)
Dept: INTERNAL MEDICINE CLINIC | Facility: CLINIC | Age: 60
End: 2020-10-05

## 2020-10-05 NOTE — TELEPHONE ENCOUNTER
Patient requesting order for Covid test due to wedding this Sat. Denied exposure to Covid, reports allergy symptoms for few months, has been worse over the last week, denied fever or any other symptoms.  Patient advised we do not have rapid testing availabl

## 2021-02-02 NOTE — TELEPHONE ENCOUNTER
Requested Prescriptions     Pending Prescriptions Disp Refills   • Mesalamine ER (PENTASA) 500 MG Oral Cap  capsule 0     Sig: Take 3 capsules (1,500 mg total) by mouth 2 (two) times daily.      LOV 11/06/2018  LR 6/22/2020  I contacted patient and sc

## 2021-04-08 ENCOUNTER — OFFICE VISIT (OUTPATIENT)
Dept: GASTROENTEROLOGY | Facility: CLINIC | Age: 61
End: 2021-04-08
Payer: COMMERCIAL

## 2021-04-08 VITALS
DIASTOLIC BLOOD PRESSURE: 84 MMHG | HEART RATE: 70 BPM | BODY MASS INDEX: 27.63 KG/M2 | WEIGHT: 193 LBS | SYSTOLIC BLOOD PRESSURE: 141 MMHG | HEIGHT: 70 IN

## 2021-04-08 DIAGNOSIS — K50.00 CROHN'S DISEASE INVOLVING TERMINAL ILEUM (HCC): Primary | ICD-10-CM

## 2021-04-08 PROCEDURE — 3077F SYST BP >= 140 MM HG: CPT | Performed by: INTERNAL MEDICINE

## 2021-04-08 PROCEDURE — 3008F BODY MASS INDEX DOCD: CPT | Performed by: INTERNAL MEDICINE

## 2021-04-08 PROCEDURE — 99213 OFFICE O/P EST LOW 20 MIN: CPT | Performed by: INTERNAL MEDICINE

## 2021-04-08 PROCEDURE — 3079F DIAST BP 80-89 MM HG: CPT | Performed by: INTERNAL MEDICINE

## 2021-04-09 NOTE — PROGRESS NOTES
HPI/Subjective:   Patient ID: Brice Franklin is a 62year old male. HPI  Dipika Cuellar returns in follow-up. He was last seen at endoscopy in November 2018.     As per previous notes Dipika Cuellar has over a 20 year history of short segment (7 cm) ileal Crohn's disease. 10-20 MG Oral Tab TAKE 1 TABLET NIGHTLY AT BEDTIME 90 tablet 3   • Pantoprazole Sodium 40 MG Oral Tab EC Take 1 tablet (40 mg total) by mouth 2 (two) times daily.  180 tablet 3   • Fexofenadine-Pseudoephed ER (ALLEGRA-D)  MG Oral Tablet 12 Hr Take 1 t disease. He is asymptomatic. His last colonoscopy in 2014 (he believes more recently done) revealed minimal changes in the ileum and transverse colon. As mentioned previously he is in clinical remission.   I have discussed with Feliciano Vazquez that the likelihood of

## 2021-04-09 NOTE — PATIENT INSTRUCTIONS
1.  You may continue or discontinue the mesalamine (Pentasa) as per your preference. Resume if you stop and symptoms develop. 2.  I cannot find a recent colonoscopy report. The last colonoscopy appears to be in 2014.   I would proceed with a follow-up up

## 2021-04-28 ENCOUNTER — OFFICE VISIT (OUTPATIENT)
Dept: INTERNAL MEDICINE CLINIC | Facility: CLINIC | Age: 61
End: 2021-04-28
Payer: COMMERCIAL

## 2021-04-28 VITALS
SYSTOLIC BLOOD PRESSURE: 137 MMHG | BODY MASS INDEX: 27.2 KG/M2 | WEIGHT: 190 LBS | HEIGHT: 70 IN | HEART RATE: 75 BPM | DIASTOLIC BLOOD PRESSURE: 81 MMHG

## 2021-04-28 DIAGNOSIS — R31.29 MICROSCOPIC HEMATURIA: ICD-10-CM

## 2021-04-28 DIAGNOSIS — I10 ESSENTIAL HYPERTENSION: ICD-10-CM

## 2021-04-28 DIAGNOSIS — Z00.00 ANNUAL PHYSICAL EXAM: Primary | ICD-10-CM

## 2021-04-28 DIAGNOSIS — E78.00 HYPERCHOLESTEROLEMIA: ICD-10-CM

## 2021-04-28 DIAGNOSIS — K76.0 FATTY LIVER: ICD-10-CM

## 2021-04-28 DIAGNOSIS — K50.00 CROHN'S DISEASE INVOLVING TERMINAL ILEUM (HCC): ICD-10-CM

## 2021-04-28 DIAGNOSIS — Z82.49 FAMILY HISTORY OF HYPERTROPHIC CARDIOMYOPATHY: ICD-10-CM

## 2021-04-28 DIAGNOSIS — M25.531 RIGHT WRIST PAIN: ICD-10-CM

## 2021-04-28 DIAGNOSIS — Z23 NEED FOR VACCINATION: ICD-10-CM

## 2021-04-28 DIAGNOSIS — Z12.11 COLON CANCER SCREENING: ICD-10-CM

## 2021-04-28 PROBLEM — N50.812 TESTICULAR PAIN, LEFT: Status: RESOLVED | Noted: 2018-03-27 | Resolved: 2021-04-28

## 2021-04-28 PROBLEM — M54.2 CERVICALGIA: Status: RESOLVED | Noted: 2018-02-06 | Resolved: 2021-04-28

## 2021-04-28 PROCEDURE — 90471 IMMUNIZATION ADMIN: CPT | Performed by: INTERNAL MEDICINE

## 2021-04-28 PROCEDURE — 3079F DIAST BP 80-89 MM HG: CPT | Performed by: INTERNAL MEDICINE

## 2021-04-28 PROCEDURE — 3008F BODY MASS INDEX DOCD: CPT | Performed by: INTERNAL MEDICINE

## 2021-04-28 PROCEDURE — 90750 HZV VACC RECOMBINANT IM: CPT | Performed by: INTERNAL MEDICINE

## 2021-04-28 PROCEDURE — 3075F SYST BP GE 130 - 139MM HG: CPT | Performed by: INTERNAL MEDICINE

## 2021-04-28 PROCEDURE — 99396 PREV VISIT EST AGE 40-64: CPT | Performed by: INTERNAL MEDICINE

## 2021-04-28 RX ORDER — MULTIVIT-MIN/IRON/FOLIC ACID/K 18-600-40
CAPSULE ORAL
COMMUNITY

## 2021-04-28 RX ORDER — MULTIVITAMIN
TABLET ORAL
COMMUNITY

## 2021-04-28 NOTE — PROGRESS NOTES
Patient ID: Leocadia Mcburney is a 62year old male. Patient presents with:  Physical       HISTORY OF PRESENT ILLNESS:   HPI  Patient presents for above.   Here for annual physical and to discuss multiple medical issues.     Complaining of right wrist pain hypertension    • Varicella zoster        Past Surgical History:   Procedure Laterality Date   • COLONOSCOPY & POLYPECTOMY  6-24-14   • HERNIA SURGERY  1961    Repair    • OTHER SURGICAL HISTORY  2006    Per next gen, surgery for skin cancer    • TONSILLEC Rarely; beer and liquor       Drug use: No      Sexual activity: Not on file    Other Topics      Concerns:         Service: Not Asked        Blood Transfusions: Not Asked        Caffeine Concern: Yes          Daily; one cup, tea         Occupation Rhythm: Normal rate and regular rhythm. Heart sounds: Normal heart sounds. Pulmonary:      Effort: Pulmonary effort is normal. No respiratory distress. Abdominal:      General: Bowel sounds are normal. There is no distension.       Palpations: [employer] voice recognition dictation software. As a result errors may occur. When identified these errors have been corrected.  While every attempt is made to correct errors during dictation discrepancies may still exist.    Murali Aldana MD  4/28/2021

## 2021-04-29 ENCOUNTER — LAB ENCOUNTER (OUTPATIENT)
Dept: LAB | Age: 61
End: 2021-04-29
Attending: INTERNAL MEDICINE
Payer: COMMERCIAL

## 2021-04-29 DIAGNOSIS — Z00.00 ANNUAL PHYSICAL EXAM: ICD-10-CM

## 2021-04-29 DIAGNOSIS — R31.29 MICROSCOPIC HEMATURIA: ICD-10-CM

## 2021-04-29 DIAGNOSIS — E78.00 HYPERCHOLESTEROLEMIA: ICD-10-CM

## 2021-04-29 DIAGNOSIS — K76.0 FATTY LIVER: ICD-10-CM

## 2021-04-29 PROCEDURE — 81003 URINALYSIS AUTO W/O SCOPE: CPT

## 2021-04-29 PROCEDURE — 85025 COMPLETE CBC W/AUTO DIFF WBC: CPT

## 2021-04-29 PROCEDURE — 84443 ASSAY THYROID STIM HORMONE: CPT

## 2021-04-29 PROCEDURE — 80061 LIPID PANEL: CPT

## 2021-04-29 PROCEDURE — 36415 COLL VENOUS BLD VENIPUNCTURE: CPT

## 2021-04-29 PROCEDURE — 84153 ASSAY OF PSA TOTAL: CPT

## 2021-04-29 PROCEDURE — 80053 COMPREHEN METABOLIC PANEL: CPT

## 2021-05-14 DIAGNOSIS — I10 ESSENTIAL HYPERTENSION: ICD-10-CM

## 2021-05-15 RX ORDER — NEBIVOLOL HYDROCHLORIDE 5 MG/1
TABLET ORAL
Qty: 90 TABLET | Refills: 3 | Status: SHIPPED | OUTPATIENT
Start: 2021-05-15

## 2021-09-01 ENCOUNTER — TELEPHONE (OUTPATIENT)
Dept: GASTROENTEROLOGY | Facility: CLINIC | Age: 61
End: 2021-09-01

## 2021-09-01 NOTE — TELEPHONE ENCOUNTER
Recall reminder letter mailed out to patient. Recall both colonoscopy and EGD per OV note on 4/8/2021.

## 2021-09-01 NOTE — TELEPHONE ENCOUNTER
----- Message from Alyssa Doherty RN sent at 11/12/2018 10:00 AM CST -----  Regarding: 3 yr EGD recall  Entered into Epic:Recall EGD in 3 years per Dr. Robina Gomez. Last EGD done 11/6/18, next due 11/6/21. Snapshot updated.

## 2021-09-08 NOTE — TELEPHONE ENCOUNTER
CSS, patient has not established care with new PCP here, please call to schedule follow up appt, then we can send refill request to new PCP, thank you. normal , pleasant, well nourished, in no acute distress

## 2021-10-29 ENCOUNTER — OFFICE VISIT (OUTPATIENT)
Dept: INTERNAL MEDICINE CLINIC | Facility: CLINIC | Age: 61
End: 2021-10-29
Payer: COMMERCIAL

## 2021-10-29 VITALS
HEIGHT: 70 IN | BODY MASS INDEX: 26.92 KG/M2 | WEIGHT: 188 LBS | DIASTOLIC BLOOD PRESSURE: 77 MMHG | HEART RATE: 62 BPM | SYSTOLIC BLOOD PRESSURE: 133 MMHG

## 2021-10-29 DIAGNOSIS — K50.00 CROHN'S DISEASE INVOLVING TERMINAL ILEUM (HCC): ICD-10-CM

## 2021-10-29 DIAGNOSIS — I10 ESSENTIAL HYPERTENSION: Primary | ICD-10-CM

## 2021-10-29 DIAGNOSIS — Z23 NEED FOR VACCINATION: ICD-10-CM

## 2021-10-29 DIAGNOSIS — E78.00 HYPERCHOLESTEROLEMIA: ICD-10-CM

## 2021-10-29 PROCEDURE — 3075F SYST BP GE 130 - 139MM HG: CPT | Performed by: INTERNAL MEDICINE

## 2021-10-29 PROCEDURE — 90472 IMMUNIZATION ADMIN EACH ADD: CPT | Performed by: INTERNAL MEDICINE

## 2021-10-29 PROCEDURE — 99214 OFFICE O/P EST MOD 30 MIN: CPT | Performed by: INTERNAL MEDICINE

## 2021-10-29 PROCEDURE — 90471 IMMUNIZATION ADMIN: CPT | Performed by: INTERNAL MEDICINE

## 2021-10-29 PROCEDURE — 90686 IIV4 VACC NO PRSV 0.5 ML IM: CPT | Performed by: INTERNAL MEDICINE

## 2021-10-29 PROCEDURE — 90750 HZV VACC RECOMBINANT IM: CPT | Performed by: INTERNAL MEDICINE

## 2021-10-29 PROCEDURE — 3078F DIAST BP <80 MM HG: CPT | Performed by: INTERNAL MEDICINE

## 2021-10-29 PROCEDURE — 3008F BODY MASS INDEX DOCD: CPT | Performed by: INTERNAL MEDICINE

## 2021-10-29 NOTE — PROGRESS NOTES
Patient ID: Fallon Burciaga is a 62year old male. Patient presents with: Follow - Up: 6 month follow up        HISTORY OF PRESENT ILLNESS:   HPI  Patient presents for above. Here for 6-month follow-up. History of hypertension on monotherapy.   This UT) Oral Cap, Take by mouth., Disp: , Rfl:   •  Multiple Vitamin (MULTI-VITAMIN DAILY) Oral Tab, Take by mouth., Disp: , Rfl:   •  EZETIMIBE-SIMVASTATIN 10-20 MG Oral Tab, TAKE 1 TABLET NIGHTLY AT BEDTIME, Disp: 90 tablet, Rfl: 3  •  ondansetron 4 MG Oral file  Food Insecurity:       Worried About [address] [email or web address] in the Last Year: Not on file      Ran Out of Food in the Last Year: Not on file  Transportation Needs:       Lack of Transportation (Medical):  Not on file      Lack of Transportation (Non-Medi Normal range of motion. Neurological:      General: No focal deficit present. Mental Status: He is alert. Psychiatric:         Mood and Affect: Mood normal.         Behavior: Behavior normal.           ASSESSMENT/PLAN:   1.  Essential hypertension

## 2021-11-14 DIAGNOSIS — E78.00 HYPERCHOLESTEROLEMIA: ICD-10-CM

## 2021-11-15 RX ORDER — EZETIMIBE AND SIMVASTATIN 10; 20 MG/1; MG/1
TABLET ORAL
Qty: 90 TABLET | Refills: 1 | Status: SHIPPED | OUTPATIENT
Start: 2021-11-15 | End: 2022-05-17

## 2021-11-15 NOTE — TELEPHONE ENCOUNTER
Refill passed per [place] [facility] protocol.     Requested Prescriptions   Pending Prescriptions Disp Refills    EZETIMIBE-SIMVASTATIN 10-20 MG Oral Tab [Pharmacy Med Name: EZETIMIBE/SIMVASTATIN TABS 10/20MG] 90 tablet 3     Sig: TAKE 1 TABLET NIGHTLY AT BEDTIME        Cholesterol Medication Protocol Passed - 11/14/2021  9:17 PM        Passed - ALT in past 12 months        Passed - LDL in past 12 months        Passed - Last ALT < 80       Lab Results   Component Value Date    ALT 36 04/29/2021             Passed - Last LDL < 130     Lab Results   Component Value Date    LDL 46 04/29/2021               Passed - Appointment in past 12 or next 3 months              Future Appointments         Provider Department Appt Notes    In 5 months aKssandra Robertson MD [facility], [address] [facility] px            Recent Outpatient Visits              2 weeks ago Essential hypertension    Urszula Pinto, Anyi Baech MD    Office Visit    6 months ago Annual physical exam    Tatum Dexter MD    Office Visit    7 months ago Crohn's disease involving terminal ileum [facility])    Carlos Harman, Angelo Swift MD    Office Visit    2 years ago Hypercholesterolemia    Urszula Pinto Rosealee Galley, MD    Office Visit    2 years ago Annual physical exam    [address]    Office Visit

## 2022-05-10 RX ORDER — NEBIVOLOL 5 MG/1
5 TABLET ORAL DAILY
Qty: 90 TABLET | Refills: 0 | Status: SHIPPED | OUTPATIENT
Start: 2022-05-10

## 2022-05-10 NOTE — TELEPHONE ENCOUNTER
Please review. Protocol Failed or has no Protocol.     Requested Prescriptions   Pending Prescriptions Disp Refills    NEBIVOLOL 5 MG Oral Tab [Pharmacy Med Name: NEBIVOLOL TABS 5MG] 90 tablet 3     Sig: TAKE 1 TABLET DAILY        Hypertensive Medications Protocol Failed - 5/9/2022 11:16 PM        Failed - CMP or BMP in past 12 months        Failed - Appointment in past 6 or next 3 months        Passed - GFR Non- > 50     Lab Results   Component Value Date    GFRNAA 68 04/29/2021                      Recent Outpatient Visits              6 months ago Essential hypertension    Urszula Bermudez Deloras Rex, MD    Office Visit    1 year ago Annual physical exam    Nancy Cartwright MD    Office Visit    1 year ago Crohn's disease involving terminal ileum [facility])    Wilberto Hawkins MD    Office Visit    2 years ago Hypercholesterolemia    Urszula Bermudez Deloras Rex, MD    Office Visit    2 years ago Annual physical exam    [address]    Office Visit

## 2022-05-17 DIAGNOSIS — E78.00 HYPERCHOLESTEROLEMIA: ICD-10-CM

## 2022-05-17 RX ORDER — EZETIMIBE AND SIMVASTATIN 10; 20 MG/1; MG/1
TABLET ORAL
Qty: 90 TABLET | Refills: 3 | Status: SHIPPED | OUTPATIENT
Start: 2022-05-17

## 2022-08-07 DIAGNOSIS — I10 ESSENTIAL HYPERTENSION: ICD-10-CM

## 2022-08-08 RX ORDER — NEBIVOLOL 5 MG/1
TABLET ORAL
Qty: 90 TABLET | Refills: 0 | Status: SHIPPED | OUTPATIENT
Start: 2022-08-08

## 2022-09-06 ENCOUNTER — LAB ENCOUNTER (OUTPATIENT)
Dept: LAB | Age: 62
End: 2022-09-06
Attending: INTERNAL MEDICINE
Payer: COMMERCIAL

## 2022-09-06 ENCOUNTER — OFFICE VISIT (OUTPATIENT)
Dept: INTERNAL MEDICINE CLINIC | Facility: CLINIC | Age: 62
End: 2022-09-06
Payer: COMMERCIAL

## 2022-09-06 VITALS
WEIGHT: 189 LBS | HEART RATE: 69 BPM | HEIGHT: 70 IN | SYSTOLIC BLOOD PRESSURE: 120 MMHG | DIASTOLIC BLOOD PRESSURE: 82 MMHG | BODY MASS INDEX: 27.06 KG/M2

## 2022-09-06 DIAGNOSIS — K50.00 CROHN'S DISEASE INVOLVING TERMINAL ILEUM (HCC): ICD-10-CM

## 2022-09-06 DIAGNOSIS — I10 ESSENTIAL HYPERTENSION: ICD-10-CM

## 2022-09-06 DIAGNOSIS — K76.0 FATTY LIVER: ICD-10-CM

## 2022-09-06 DIAGNOSIS — M21.612 BUNION OF GREAT TOE OF LEFT FOOT: ICD-10-CM

## 2022-09-06 DIAGNOSIS — Z12.11 COLON CANCER SCREENING: ICD-10-CM

## 2022-09-06 DIAGNOSIS — E78.00 HYPERCHOLESTEROLEMIA: ICD-10-CM

## 2022-09-06 DIAGNOSIS — Z00.00 ANNUAL PHYSICAL EXAM: Primary | ICD-10-CM

## 2022-09-06 DIAGNOSIS — Z00.00 ANNUAL PHYSICAL EXAM: ICD-10-CM

## 2022-09-06 LAB
ALBUMIN SERPL-MCNC: 3.7 G/DL (ref 3.4–5)
ALBUMIN/GLOB SERPL: 1.1 {RATIO} (ref 1–2)
ALP LIVER SERPL-CCNC: 81 U/L
ALT SERPL-CCNC: 35 U/L
ANION GAP SERPL CALC-SCNC: 5 MMOL/L (ref 0–18)
AST SERPL-CCNC: 28 U/L (ref 15–37)
BASOPHILS # BLD AUTO: 0.03 X10(3) UL (ref 0–0.2)
BASOPHILS NFR BLD AUTO: 0.5 %
BILIRUB SERPL-MCNC: 1.1 MG/DL (ref 0.1–2)
BUN BLD-MCNC: 18 MG/DL (ref 7–18)
BUN/CREAT SERPL: 12.9 (ref 10–20)
CALCIUM BLD-MCNC: 9.2 MG/DL (ref 8.5–10.1)
CHLORIDE SERPL-SCNC: 109 MMOL/L (ref 98–112)
CHOLEST SERPL-MCNC: 119 MG/DL (ref ?–200)
CO2 SERPL-SCNC: 29 MMOL/L (ref 21–32)
CREAT BLD-MCNC: 1.39 MG/DL
CREAT UR-SCNC: 229 MG/DL
DEPRECATED RDW RBC AUTO: 44.3 FL (ref 35.1–46.3)
EOSINOPHIL # BLD AUTO: 0.14 X10(3) UL (ref 0–0.7)
EOSINOPHIL NFR BLD AUTO: 2.1 %
ERYTHROCYTE [DISTWIDTH] IN BLOOD BY AUTOMATED COUNT: 12.7 % (ref 11–15)
FASTING PATIENT LIPID ANSWER: NO
FASTING STATUS PATIENT QL REPORTED: NO
GFR SERPLBLD BASED ON 1.73 SQ M-ARVRAT: 58 ML/MIN/1.73M2 (ref 60–?)
GLOBULIN PLAS-MCNC: 3.5 G/DL (ref 2.8–4.4)
GLUCOSE BLD-MCNC: 102 MG/DL (ref 70–99)
HCT VFR BLD AUTO: 46 %
HDLC SERPL-MCNC: 42 MG/DL (ref 40–59)
HGB BLD-MCNC: 15 G/DL
IMM GRANULOCYTES # BLD AUTO: 0.01 X10(3) UL (ref 0–1)
IMM GRANULOCYTES NFR BLD: 0.2 %
LDLC SERPL CALC-MCNC: 50 MG/DL (ref ?–100)
LYMPHOCYTES # BLD AUTO: 1.34 X10(3) UL (ref 1–4)
LYMPHOCYTES NFR BLD AUTO: 20.6 %
MCH RBC QN AUTO: 30.7 PG (ref 26–34)
MCHC RBC AUTO-ENTMCNC: 32.6 G/DL (ref 31–37)
MCV RBC AUTO: 94.3 FL
MICROALBUMIN UR-MCNC: 2.46 MG/DL
MICROALBUMIN/CREAT 24H UR-RTO: 10.7 UG/MG (ref ?–30)
MONOCYTES # BLD AUTO: 0.6 X10(3) UL (ref 0.1–1)
MONOCYTES NFR BLD AUTO: 9.2 %
NEUTROPHILS # BLD AUTO: 4.4 X10 (3) UL (ref 1.5–7.7)
NEUTROPHILS # BLD AUTO: 4.4 X10(3) UL (ref 1.5–7.7)
NEUTROPHILS NFR BLD AUTO: 67.4 %
NONHDLC SERPL-MCNC: 77 MG/DL (ref ?–130)
OSMOLALITY SERPL CALC.SUM OF ELEC: 298 MOSM/KG (ref 275–295)
PLATELET # BLD AUTO: 190 10(3)UL (ref 150–450)
POTASSIUM SERPL-SCNC: 4.6 MMOL/L (ref 3.5–5.1)
PROT SERPL-MCNC: 7.2 G/DL (ref 6.4–8.2)
PSA SERPL-MCNC: 1.86 NG/ML (ref ?–4)
RBC # BLD AUTO: 4.88 X10(6)UL
SODIUM SERPL-SCNC: 143 MMOL/L (ref 136–145)
TRIGL SERPL-MCNC: 161 MG/DL (ref 30–149)
TSI SER-ACNC: 1.58 MIU/ML (ref 0.36–3.74)
VLDLC SERPL CALC-MCNC: 23 MG/DL (ref 0–30)
WBC # BLD AUTO: 6.5 X10(3) UL (ref 4–11)

## 2022-09-06 PROCEDURE — 80061 LIPID PANEL: CPT

## 2022-09-06 PROCEDURE — 3074F SYST BP LT 130 MM HG: CPT | Performed by: INTERNAL MEDICINE

## 2022-09-06 PROCEDURE — 85025 COMPLETE CBC W/AUTO DIFF WBC: CPT

## 2022-09-06 PROCEDURE — 84153 ASSAY OF PSA TOTAL: CPT

## 2022-09-06 PROCEDURE — 82570 ASSAY OF URINE CREATININE: CPT

## 2022-09-06 PROCEDURE — 80053 COMPREHEN METABOLIC PANEL: CPT

## 2022-09-06 PROCEDURE — 99396 PREV VISIT EST AGE 40-64: CPT | Performed by: INTERNAL MEDICINE

## 2022-09-06 PROCEDURE — 3008F BODY MASS INDEX DOCD: CPT | Performed by: INTERNAL MEDICINE

## 2022-09-06 PROCEDURE — 84443 ASSAY THYROID STIM HORMONE: CPT

## 2022-09-06 PROCEDURE — 3079F DIAST BP 80-89 MM HG: CPT | Performed by: INTERNAL MEDICINE

## 2022-09-06 PROCEDURE — 82043 UR ALBUMIN QUANTITATIVE: CPT

## 2022-09-06 PROCEDURE — 36415 COLL VENOUS BLD VENIPUNCTURE: CPT

## 2022-11-06 DIAGNOSIS — I10 ESSENTIAL HYPERTENSION: ICD-10-CM

## 2022-11-07 RX ORDER — NEBIVOLOL 5 MG/1
5 TABLET ORAL DAILY
Qty: 90 TABLET | Refills: 1 | Status: SHIPPED | OUTPATIENT
Start: 2022-11-07

## 2022-11-08 NOTE — TELEPHONE ENCOUNTER
Refill passed per [address] protocol.     .  Requested Prescriptions   Pending Prescriptions Disp Refills    NEBIVOLOL 5 MG Oral Tab [Pharmacy Med Name: NEBIVOLOL TABS 5MG] 90 tablet 3     Sig: TAKE 1 TABLET DAILY       Hypertensive Medications Protocol Passed - 11/6/2022 11:18 PM        Passed - In person appointment in the past 12 or next 3 months     Recent Outpatient Visits              2 months ago Annual physical exam    Krystle Mosquera MD    Office Visit    1 year ago Essential hypertension    Urszula Fang [place], MD    Office Visit    1 year ago Annual physical exam    Urszula Fang [place], MD    Office Visit    1 year ago Crohn's disease involving terminal ileum [facility])    Gertrude Tineo MD    Office Visit    3 years ago Hypercholesterolemia    [address], [address] Priyank Stewart MD    Office Visit          Future Appointments         Provider Department Appt Notes    In 1 month Mirna Harvey MD [address], [address], [facility] Follow up., My Chart sent [place] 8/31/22    In 4 months Myles Barbosa MD [address], [address] Sera Stewart 6M FU, Pls schedule next [facility] after 9/6/23               Passed - Last BP reading less than 140/90     BP Readings from Last 1 Encounters:  09/06/22 : 120/82              Passed - CMP or BMP in past 6 months     Recent Results (from the past 4392 hour(s))   COMP METABOLIC PANEL (14)    Collection Time: 09/06/22 11:22 AM   Result Value Ref Range    Glucose 102 (H) 70 - 99 mg/dL    Sodium 143 136 - 145 mmol/L    Potassium 4.6 3.5 - 5.1 mmol/L    Chloride 109 98 - 112 mmol/L    CO2 29.0 21.0 - 32.0 mmol/L    Anion Gap 5 0 - 18 mmol/L    BUN 18 7 - 18 mg/dL    Creatinine 1.39 (H) 0.70 - 1.30 mg/dL    BUN/CREA Ratio 12.9 10.0 - 20.0    Calcium, Total 9.2 8.5 - 10.1 mg/dL    Calculated Osmolality 298 (H) 275 - 295 mOsm/kg    eGFR-Cr 58 (L) >=60 mL/min/1.73m2    ALT 35 16 - 61 U/L    AST 28 15 - 37 U/L    Alkaline Phosphatase 81 45 - 117 U/L    Bilirubin, Total 1.1 0.1 - 2.0 mg/dL    Total Protein 7.2 6.4 - 8.2 g/dL    Albumin 3.7 3.4 - 5.0 g/dL    Globulin  3.5 2.8 - 4.4 g/dL    A/G Ratio 1.1 1.0 - 2.0    Patient Fasting for CMP? No      *Note: Due to a large number of results and/or encounters for the requested time period, some results have not been displayed. A complete set of results can be found in Results Review.                Passed - In person appointment or virtual visit in the past 6 months     Recent Outpatient Visits              2 months ago Annual physical exam    Micaela Miller MD    Office Visit    1 year ago Essential hypertension    Urszula Guzman Rosalinda Sabina, MD    Office Visit    1 year ago Annual physical exam    Micaela Miller MD    Office Visit    1 year ago Crohn's disease involving terminal ileum [facility])    Jamila Castillo MD    Office Visit    3 years ago Hypercholesterolemia    Joy Guzman MD    Office Visit          Future Appointments         Provider Department Appt Notes    In 1 month Violet Almendarez MD [place], [facility], [address], [place] Follow up., My Chart sent [place] 8/31/22    In 4 months Milo Nieves MD [place], [facility], [address] [facility] 6M FU, Pls schedule [facility] after 9/6/23               Passed - EGFRCR or GFRNAA > 50     GFR Evaluation  EGFRCR: 62 , resulted on 9/6/2022               Recent Outpatient Visits              2 months ago Annual physical exam    Micaela Miller MD    Office Visit    1 year ago Essential hypertension    Urszula Guzman Rosalinda Sabina, MD    Office Visit 1 year ago Annual physical exam    Daniel Mello MD    Office Visit    1 year ago Crohn's disease involving terminal ileum [facility])    Christin Fields MD    Office Visit    3 years ago Rob Arboleda MD    Office Visit            Future Appointments         Provider Department Appt Notes    In 1 month Olu Henderson MD [employer], [address], [address] Follow up., My Chart sent [place] 8/31/22    In 4 months Disha Martinez MD [place] [facility], [facility], [facility] 6M FU, Pls schedule next [place] after 9/6/23

## 2022-12-07 ENCOUNTER — OFFICE VISIT (OUTPATIENT)
Facility: CLINIC | Age: 62
End: 2022-12-07
Payer: COMMERCIAL

## 2022-12-07 VITALS
SYSTOLIC BLOOD PRESSURE: 149 MMHG | BODY MASS INDEX: 27.63 KG/M2 | DIASTOLIC BLOOD PRESSURE: 81 MMHG | HEIGHT: 70 IN | WEIGHT: 193 LBS | HEART RATE: 69 BPM

## 2022-12-07 DIAGNOSIS — K50.00 CROHN'S DISEASE INVOLVING TERMINAL ILEUM (HCC): Primary | ICD-10-CM

## 2022-12-07 DIAGNOSIS — K29.60 LYMPHOCYTIC GASTRITIS: ICD-10-CM

## 2022-12-07 PROCEDURE — 3008F BODY MASS INDEX DOCD: CPT | Performed by: INTERNAL MEDICINE

## 2022-12-07 PROCEDURE — 3077F SYST BP >= 140 MM HG: CPT | Performed by: INTERNAL MEDICINE

## 2022-12-07 PROCEDURE — 3079F DIAST BP 80-89 MM HG: CPT | Performed by: INTERNAL MEDICINE

## 2022-12-07 PROCEDURE — 99213 OFFICE O/P EST LOW 20 MIN: CPT | Performed by: INTERNAL MEDICINE

## 2022-12-07 NOTE — PATIENT INSTRUCTIONS
1.  Schedule colonoscopy for history of Crohn's disease following a split dose MiraLAX/Gatorade preparation and monitored anesthesia care. 2.  Schedule concurrent upper endoscopy for a history of lymphocytic gastritis.
-Has been on sertaline, but did not like what it did to her  -Has been on Lexapro in the past stopped taking it for unknown reasons  -Psychiatry will be consulted

## 2022-12-16 ENCOUNTER — TELEPHONE (OUTPATIENT)
Facility: CLINIC | Age: 62
End: 2022-12-16

## 2022-12-16 DIAGNOSIS — K29.60 LYMPHOCYTIC GASTRITIS: Primary | ICD-10-CM

## 2022-12-16 DIAGNOSIS — Z87.19 HISTORY OF CROHN'S DISEASE: ICD-10-CM

## 2022-12-16 NOTE — TELEPHONE ENCOUNTER
Surgery Schedulers- please contact patient and schedule colonoscopy per Dr. Reyes Greet order from 12/7/22 office visit. Thank you.

## 2022-12-29 NOTE — TELEPHONE ENCOUNTER
1.  Schedule colonoscopy for history of Crohn's disease following a split dose MiraLAX/Gatorade preparation and monitored anesthesia care. 2.  Schedule concurrent upper endoscopy for a history of lymphocytic gastritis.

## 2022-12-29 NOTE — TELEPHONE ENCOUNTER
I contacted patient and scheduled procedures. He had many questions about prep, since he read instructions given to him iin office. Patient requested end of March appt    Scheduled for: Colonoscopy  [address] [facility]   Provider Name:  Tejas Gamino   Date: Fri 3/31/2023   Location: [facility]   Sedation:  MAC  Time: 8:15 am, (pt is aware that [facility] will call the day before to confirm arrival time)  Prep: split miralx/gatorade   Meds/Allergies Reconciled?: NKDA   Diagnosis with codes:  Hx crohns Z87.19,  Lymphocytic gastritis K29.60  Was patient informed to call insurance with codes (Y/N):  Yes  Referral sent?:  Yes  [facility] or [address] notified?: Electronic case request was sent to [facility] via [employer]. Medication Orders: Pt is aware to NOT take iron pills, herbal meds and diet supplements for 7 days before exam. Also to NOT take any form of alcohol, recreational drugs and any forms of ED meds 24 hours before exam.       Misc Orders: Patient was informed that they will need a COVID 19 test prior to their procedure. Patient verbally understood & will await a phone call from [facility] to schedule. Further instructions given by staff:   Instructions given and pt verbalized understanding    Jaqueline perea sent

## 2023-02-05 ENCOUNTER — APPOINTMENT (OUTPATIENT)
Dept: GENERAL RADIOLOGY | Age: 63
End: 2023-02-05
Attending: NURSE PRACTITIONER
Payer: COMMERCIAL

## 2023-02-05 ENCOUNTER — HOSPITAL ENCOUNTER (OUTPATIENT)
Age: 63
Discharge: HOME | End: 2023-02-05
Payer: COMMERCIAL

## 2023-02-05 VITALS
RESPIRATION RATE: 16 BRPM | HEART RATE: 65 BPM | DIASTOLIC BLOOD PRESSURE: 74 MMHG | OXYGEN SATURATION: 99 % | SYSTOLIC BLOOD PRESSURE: 159 MMHG | TEMPERATURE: 98 F

## 2023-02-05 DIAGNOSIS — M25.531 RIGHT WRIST PAIN: ICD-10-CM

## 2023-02-05 DIAGNOSIS — M79.641 HAND PAIN, RIGHT: Primary | ICD-10-CM

## 2023-02-05 PROCEDURE — 99203 OFFICE O/P NEW LOW 30 MIN: CPT

## 2023-02-05 PROCEDURE — 73130 X-RAY EXAM OF HAND: CPT | Performed by: NURSE PRACTITIONER

## 2023-02-05 PROCEDURE — 73110 X-RAY EXAM OF WRIST: CPT | Performed by: NURSE PRACTITIONER

## 2023-02-05 PROCEDURE — 99213 OFFICE O/P EST LOW 20 MIN: CPT

## 2023-02-05 NOTE — ED INITIAL ASSESSMENT (HPI)
Patient with right palm and wrist discomfort for 2 weeks   Patient states he was crawling on the ground at home and feels like he hyper extended the wrist  States similar pain a couple years ago which was better with Ibuprofen and wrist brace.    Using same remedy without relief

## 2023-02-05 NOTE — DISCHARGE INSTRUCTIONS
Wear your Velcro wrist splint that you have at home for comfort continue ibuprofen or Tylenol for pain make sure there is food on your stomach when you take the ibuprofen. Avoid heavy lifting pushing or pulling. You may apply ice pack to area of discomfort 2-3 times per day inside of a pillowcase or cloth. Follow-up with the hand specialist as discussed.

## 2023-02-06 ENCOUNTER — OFFICE VISIT (OUTPATIENT)
Dept: SURGERY | Facility: CLINIC | Age: 63
End: 2023-02-06

## 2023-02-06 DIAGNOSIS — M65.841 OTHER SYNOVITIS AND TENOSYNOVITIS, RIGHT HAND: ICD-10-CM

## 2023-02-06 DIAGNOSIS — M19.041 PRIMARY OSTEOARTHRITIS OF RIGHT HAND: ICD-10-CM

## 2023-02-06 DIAGNOSIS — M79.641 PAIN IN RIGHT HAND: Primary | ICD-10-CM

## 2023-02-06 PROCEDURE — 99244 OFF/OP CNSLTJ NEW/EST MOD 40: CPT | Performed by: PLASTIC SURGERY

## 2023-02-06 RX ORDER — METHYLPREDNISOLONE 4 MG/1
TABLET ORAL
Qty: 1 EACH | Refills: 0 | Status: SHIPPED | OUTPATIENT
Start: 2023-02-06

## 2023-02-20 ENCOUNTER — OFFICE VISIT (OUTPATIENT)
Dept: SURGERY | Facility: CLINIC | Age: 63
End: 2023-02-20

## 2023-02-20 DIAGNOSIS — M65.841 OTHER SYNOVITIS AND TENOSYNOVITIS, RIGHT HAND: Primary | ICD-10-CM

## 2023-02-20 PROCEDURE — 99213 OFFICE O/P EST LOW 20 MIN: CPT | Performed by: PLASTIC SURGERY

## 2023-03-07 ENCOUNTER — OFFICE VISIT (OUTPATIENT)
Dept: INTERNAL MEDICINE CLINIC | Facility: CLINIC | Age: 63
End: 2023-03-07

## 2023-03-07 VITALS
WEIGHT: 193 LBS | DIASTOLIC BLOOD PRESSURE: 80 MMHG | OXYGEN SATURATION: 100 % | BODY MASS INDEX: 27.63 KG/M2 | HEIGHT: 70 IN | SYSTOLIC BLOOD PRESSURE: 130 MMHG | HEART RATE: 65 BPM

## 2023-03-07 DIAGNOSIS — K50.00 CROHN'S DISEASE INVOLVING TERMINAL ILEUM (HCC): ICD-10-CM

## 2023-03-07 DIAGNOSIS — I10 ESSENTIAL HYPERTENSION: Primary | ICD-10-CM

## 2023-03-07 PROCEDURE — 3079F DIAST BP 80-89 MM HG: CPT | Performed by: INTERNAL MEDICINE

## 2023-03-07 PROCEDURE — 3075F SYST BP GE 130 - 139MM HG: CPT | Performed by: INTERNAL MEDICINE

## 2023-03-07 PROCEDURE — 3008F BODY MASS INDEX DOCD: CPT | Performed by: INTERNAL MEDICINE

## 2023-03-07 PROCEDURE — 99213 OFFICE O/P EST LOW 20 MIN: CPT | Performed by: INTERNAL MEDICINE

## 2023-03-28 ENCOUNTER — LAB REQUISITION (OUTPATIENT)
Dept: SURGERY | Age: 63
End: 2023-03-28
Payer: COMMERCIAL

## 2023-03-28 DIAGNOSIS — Z01.818 PREOP EXAMINATION: ICD-10-CM

## 2023-03-29 LAB — SARS-COV-2 RNA RESP QL NAA+PROBE: NOT DETECTED

## 2023-03-31 ENCOUNTER — LAB REQUISITION (OUTPATIENT)
Dept: SURGERY | Age: 63
End: 2023-03-31
Payer: COMMERCIAL

## 2023-03-31 ENCOUNTER — SURGERY CENTER DOCUMENTATION (OUTPATIENT)
Dept: SURGERY | Age: 63
End: 2023-03-31

## 2023-03-31 DIAGNOSIS — K29.60 EROSIVE GASTRITIS: ICD-10-CM

## 2023-03-31 DIAGNOSIS — Z87.19 PERSONAL HISTORY OF OTHER DISEASES OF DIGESTIVE SYSTEM: ICD-10-CM

## 2023-03-31 PROCEDURE — 45385 COLONOSCOPY W/LESION REMOVAL: CPT | Performed by: INTERNAL MEDICINE

## 2023-03-31 PROCEDURE — 88305 TISSUE EXAM BY PATHOLOGIST: CPT | Performed by: INTERNAL MEDICINE

## 2023-03-31 PROCEDURE — 43239 EGD BIOPSY SINGLE/MULTIPLE: CPT | Performed by: INTERNAL MEDICINE

## 2023-03-31 PROCEDURE — 45380 COLONOSCOPY AND BIOPSY: CPT | Performed by: INTERNAL MEDICINE

## 2023-03-31 PROCEDURE — 88312 SPECIAL STAINS GROUP 1: CPT | Performed by: INTERNAL MEDICINE

## 2023-03-31 NOTE — PROCEDURES
[address] Endoscopy Report      Date of Procedure:  03/31/23      Preoperative Diagnosis:  1. Colorectal cancer screening  2. Crohn's disease evaluate disease extent and activity  3. Lymphocytic gastroduodenitis  4. Episodic dyspepsia      Postoperative Diagnosis:  1. Colon polyp  2. Pancolonic diverticulosis  3. Quiescent ileal Crohn's disease  4. Multiple gastric epithelial nodules  5. Duodenal deformity/diverticula  6. Small hiatal hernia      Procedure:    Colonoscopy with polypectomy and biopsy  Esophagogastroduodenoscopy with biopsy      Surgeon:  Lopez Aivlez M.D. Anesthesia:  Monitored anesthesia care  Cecal withdrawal time: 25 minutes  EBL:  Insignificant      Brief History: This is a 59year old male who has over a 20-year history of mild/low risk Crohn's disease previously managed with mesalamine. The patient has been off all therapy and relatively asymptomatic. He also has a history of lymphocytic gastroduodenitis with multiple gastric nodules. His last colonoscopy has been about 8 years prior. His last upper endoscopy a little over 4 years prior. Other than occasional dyspeptic symptoms that respond to acid suppression, the patient has been asymptomatic from a gastrointestinal tract standpoint. Technique:  After informed consent, the patient was placed in the left lateral recumbent position. Digital rectal examination revealed no palpable intraluminal abnormalities. No perianal disease. An Olympus variable stiffness 190 series HD colonoscope was inserted into the rectum and advanced under direct vision by following the lumen to the terminal ileum. The colon was examined upon withdrawal in the left lateral recumbent position. Following colonoscopy, an Olympus adult HD gastroscope was inserted into the hypopharynx and advanced under direct vision into the esophagus, stomach and duodenum.   The endoscope was withdrawn to the stomach where retroflexion of the angulus, body, cardia and fundus was performed. The instrument was straightened, insufflated air and fluid were suctioned and the endoscope was withdrawn. The procedures were well tolerated without immediate complication. Findings:  The preparation of the colon was good, however, some irrigation was required. The terminal ileum was examined for 10-15 cm and characterized by deformity in the distal several centimeters with pseudodiverticula and nonulcerated polypoid areas covered by normal mucosa. No erosion, ulceration or high-grade strictures were seen. Above this area of the ileum assumed a normal appearance. Multiple biopsies were obtained. The ileocecal valve was effaced and patulous. The visualized colonic mucosa from the cecum to the anal verge was normal with an intact vascular pattern. There were a few punctate areas of scar deformity in the transverse colon without signs of active inflammation. In the proximal transverse colon there was a 5 mm sessile polyp which was cold snare excised and retrieved. No ongoing bleeding. There were scattered diverticula seen throughout the colon without current signs of complication. There were no other colonic polyps, mass lesions, vascular anomalies or signs of inflammation seen. Random biopsies were taken from the right, transverse, descending, sigmoid colon and rectum and placed in separate containers. Retroflexion in the rectum revealed no abnormalities. The esophagus was normal without evidence of ulceration, erosion, stricture, ring or Araujo's esophagus. The GE junction and diaphragmatic impression were at 41/42 cm with a 1-1.5 cm sliding hiatal hernia. The stomach distended appropriately with insufflated air.   The mucosa of the stomach including cardia, fundus, gastric body and antrum was again characterized by multiple 5-7 mm nonulcerated and not fixed soft nodules predominantly along the proximal to mid greater curvature. The cardia, fundus and antrum were spared. Multiple biopsies were taken of the aforementioned nodules and from the gastric antrum and placed in separate containers. The duodenal bulb was normal.  There appeared to be deformity/pseudodiverticula or diverticula in the descending colon without active inflammation. Biopsies from the duodenum were obtained. Impression:  1. Colon polyp  2. Uncomplicated pancolonic diverticulosis  3. Quiescent mild low risk Crohn's disease with ileal deformity and no signs of active inflammation or stricture  4. Persistent gastric epithelial nodules  5. Duodenal deformity/diverticula  6. Small hiatal hernia    Recommendations:  1. Follow-up biopsy results. 2.  Further recommendations pending biopsy.           Deborah Oliva MD  3/31/2023

## 2023-05-01 ENCOUNTER — TELEPHONE (OUTPATIENT)
Facility: CLINIC | Age: 63
End: 2023-05-01

## 2023-05-01 NOTE — TELEPHONE ENCOUNTER
----- Message from Kimberly Cabrera MD sent at 4/29/2023 12:40 PM CDT -----  I spoke to Sofía todd. I apologized for the delay in getting back to him. The colonoscopy showed no sign of active Crohn's disease. A solitary subcentimeter tubular adenoma was removed. This is likely sporadic as opposed to representing polypoid dysplasia. The upper endoscopy revealed stable gastric nodules. No unusual features were present and there was no lymphomatous infiltrate. The biopsy results are favorable. I have recommended a high soluble fiber diet for diverticulosis. I would opt to proceed with a surveillance colonoscopy in 5 years in light of the history of IBD. I do not believe that a follow-up EGD is necessary. Sofía todd will contact me with any changes. GI RNs: Please enter colonoscopy recall for 5 years.

## 2023-05-01 NOTE — TELEPHONE ENCOUNTER
Recall colon in 5 years per Dr. João Holland. Colonoscopy done on 3/31/23. Health maintenance updated and message sent to pt outreach to repeat colonoscopy in 5 years.

## 2023-05-04 DIAGNOSIS — I10 ESSENTIAL HYPERTENSION: ICD-10-CM

## 2023-05-05 RX ORDER — NEBIVOLOL 5 MG/1
TABLET ORAL
Qty: 90 TABLET | Refills: 3 | Status: SHIPPED | OUTPATIENT
Start: 2023-05-05

## 2023-05-05 NOTE — TELEPHONE ENCOUNTER
Please review. Protocol failed / No Protocol. Requested Prescriptions   Pending Prescriptions Disp Refills    NEBIVOLOL 5 MG Oral Tab [Pharmacy Med Name: NEBIVOLOL TABS 5MG] 90 tablet 3     Sig: TAKE 1 TABLET IN THE MORNING       Hypertensive Medications Protocol Failed - 5/4/2023 11:38 PM        Failed - CMP or BMP in past 6 months     No results found for this or any previous visit (from the past 4392 hour(s)).               Passed - In person appointment in the past 12 or next 3 months     Recent Outpatient Visits              1 month ago Essential hypertension    Sera Benedict MD    Office Visit    2 months ago Other synovitis and tenosynovitis, right hand    [address], [address], Yumi House MD    Office Visit    2 months ago Pain in right hand    [address], [address], [place] MD    Office Visit    4 months ago Crohn's disease involving terminal ileum [facility])    Julisa Goins MD    Office Visit    8 months ago Annual physical exam    Brenton Benedict MD    Office Visit          Future Appointments         Provider Department Appt Notes    In 4 months Yudith Torrez MD [facility], [address] [place] [address] physical               Passed - Last BP reading less than 140/90     BP Readings from Last 1 Encounters:  03/07/23 : 130/80                Passed - In person appointment or virtual visit in the past 6 months     Recent Outpatient Visits              1 month ago Essential hypertension    Sera Benedict MD    Office Visit    2 months ago Other synovitis and tenosynovitis, right hand    [address], [address], Yumi House MD    Office Visit    2 months ago Pain in right hand    [address] [facility], [employer] [facility]    Office Visit    4 months ago Crohn's disease involving terminal ileum [facility])    Ariana Arias MD    Office Visit    8 months ago Annual physical exam    [address] [facility], Rosemarie Yoon MD    Office Visit          Future Appointments         Provider Department Appt Notes    In 4 months MD Steven [facility] [facility] [employer] [facility], Sera Yancey physical               Passed - [facility] or [facility] > 50     GFR Evaluation  EGFRCR: 58 , resulted on 9/6/2022

## 2023-09-15 DIAGNOSIS — E78.00 HYPERCHOLESTEROLEMIA: ICD-10-CM

## 2023-09-17 RX ORDER — EZETIMIBE AND SIMVASTATIN 10; 20 MG/1; MG/1
TABLET ORAL
Qty: 90 TABLET | Refills: 0 | Status: SHIPPED | OUTPATIENT
Start: 2023-09-17

## 2023-10-27 ENCOUNTER — OFFICE VISIT (OUTPATIENT)
Dept: INTERNAL MEDICINE CLINIC | Facility: CLINIC | Age: 63
End: 2023-10-27

## 2023-10-27 VITALS
BODY MASS INDEX: 26.77 KG/M2 | OXYGEN SATURATION: 97 % | SYSTOLIC BLOOD PRESSURE: 134 MMHG | HEIGHT: 70 IN | DIASTOLIC BLOOD PRESSURE: 78 MMHG | RESPIRATION RATE: 14 BRPM | WEIGHT: 187 LBS | HEART RATE: 71 BPM

## 2023-10-27 DIAGNOSIS — I10 ESSENTIAL HYPERTENSION: ICD-10-CM

## 2023-10-27 DIAGNOSIS — Z23 NEED FOR VACCINATION: ICD-10-CM

## 2023-10-27 DIAGNOSIS — K76.0 FATTY LIVER: ICD-10-CM

## 2023-10-27 DIAGNOSIS — R05.3 CHRONIC COUGH: ICD-10-CM

## 2023-10-27 DIAGNOSIS — E78.00 PURE HYPERCHOLESTEROLEMIA: ICD-10-CM

## 2023-10-27 DIAGNOSIS — K50.00 CROHN'S DISEASE INVOLVING TERMINAL ILEUM (HCC): ICD-10-CM

## 2023-10-27 DIAGNOSIS — M79.10 MYALGIA: ICD-10-CM

## 2023-10-27 DIAGNOSIS — M13.0 POLYARTHRITIS: ICD-10-CM

## 2023-10-27 DIAGNOSIS — Z12.11 COLON CANCER SCREENING: ICD-10-CM

## 2023-10-27 DIAGNOSIS — Z00.00 ANNUAL PHYSICAL EXAM: Primary | ICD-10-CM

## 2023-10-27 PROCEDURE — 99213 OFFICE O/P EST LOW 20 MIN: CPT | Performed by: INTERNAL MEDICINE

## 2023-10-27 PROCEDURE — 90471 IMMUNIZATION ADMIN: CPT | Performed by: INTERNAL MEDICINE

## 2023-10-27 PROCEDURE — 3078F DIAST BP <80 MM HG: CPT | Performed by: INTERNAL MEDICINE

## 2023-10-27 PROCEDURE — 90715 TDAP VACCINE 7 YRS/> IM: CPT | Performed by: INTERNAL MEDICINE

## 2023-10-27 PROCEDURE — 90472 IMMUNIZATION ADMIN EACH ADD: CPT | Performed by: INTERNAL MEDICINE

## 2023-10-27 PROCEDURE — 3075F SYST BP GE 130 - 139MM HG: CPT | Performed by: INTERNAL MEDICINE

## 2023-10-27 PROCEDURE — 99396 PREV VISIT EST AGE 40-64: CPT | Performed by: INTERNAL MEDICINE

## 2023-10-27 PROCEDURE — 90686 IIV4 VACC NO PRSV 0.5 ML IM: CPT | Performed by: INTERNAL MEDICINE

## 2023-10-27 PROCEDURE — 3008F BODY MASS INDEX DOCD: CPT | Performed by: INTERNAL MEDICINE

## 2023-10-28 ENCOUNTER — LAB ENCOUNTER (OUTPATIENT)
Dept: LAB | Age: 63
End: 2023-10-28
Attending: INTERNAL MEDICINE
Payer: COMMERCIAL

## 2023-10-28 DIAGNOSIS — E78.00 PURE HYPERCHOLESTEROLEMIA: ICD-10-CM

## 2023-10-28 DIAGNOSIS — Z00.00 ANNUAL PHYSICAL EXAM: ICD-10-CM

## 2023-10-28 DIAGNOSIS — M79.10 MYALGIA: ICD-10-CM

## 2023-10-28 DIAGNOSIS — M13.0 POLYARTHRITIS: ICD-10-CM

## 2023-10-28 DIAGNOSIS — K76.0 FATTY LIVER: ICD-10-CM

## 2023-10-28 DIAGNOSIS — I10 ESSENTIAL HYPERTENSION: ICD-10-CM

## 2023-10-28 LAB
ALBUMIN SERPL-MCNC: 3.8 G/DL (ref 3.4–5)
ALBUMIN/GLOB SERPL: 1.1 {RATIO} (ref 1–2)
ALP LIVER SERPL-CCNC: 88 U/L
ALT SERPL-CCNC: 42 U/L
ANION GAP SERPL CALC-SCNC: 5 MMOL/L (ref 0–18)
AST SERPL-CCNC: 28 U/L (ref 15–37)
BASOPHILS # BLD AUTO: 0.03 X10(3) UL (ref 0–0.2)
BASOPHILS NFR BLD AUTO: 0.5 %
BILIRUB SERPL-MCNC: 1.3 MG/DL (ref 0.1–2)
BUN BLD-MCNC: 19 MG/DL (ref 7–18)
BUN/CREAT SERPL: 15 (ref 10–20)
CALCIUM BLD-MCNC: 9.3 MG/DL (ref 8.5–10.1)
CHLORIDE SERPL-SCNC: 107 MMOL/L (ref 98–112)
CHOLEST SERPL-MCNC: 215 MG/DL (ref ?–200)
CO2 SERPL-SCNC: 31 MMOL/L (ref 21–32)
CREAT BLD-MCNC: 1.27 MG/DL
CREAT UR-SCNC: 80.7 MG/DL
CRP SERPL-MCNC: 0.3 MG/DL (ref ?–0.3)
DEPRECATED RDW RBC AUTO: 44.6 FL (ref 35.1–46.3)
EGFRCR SERPLBLD CKD-EPI 2021: 64 ML/MIN/1.73M2 (ref 60–?)
EOSINOPHIL # BLD AUTO: 0.19 X10(3) UL (ref 0–0.7)
EOSINOPHIL NFR BLD AUTO: 3.3 %
ERYTHROCYTE [DISTWIDTH] IN BLOOD BY AUTOMATED COUNT: 13.1 % (ref 11–15)
ERYTHROCYTE [SEDIMENTATION RATE] IN BLOOD: 4 MM/HR
FASTING PATIENT LIPID ANSWER: YES
FASTING STATUS PATIENT QL REPORTED: YES
GLOBULIN PLAS-MCNC: 3.6 G/DL (ref 2.8–4.4)
GLUCOSE BLD-MCNC: 100 MG/DL (ref 70–99)
HCT VFR BLD AUTO: 46.8 %
HDLC SERPL-MCNC: 51 MG/DL (ref 40–59)
HGB BLD-MCNC: 15.5 G/DL
IMM GRANULOCYTES # BLD AUTO: 0.02 X10(3) UL (ref 0–1)
IMM GRANULOCYTES NFR BLD: 0.4 %
LDLC SERPL CALC-MCNC: 134 MG/DL (ref ?–100)
LYMPHOCYTES # BLD AUTO: 1.34 X10(3) UL (ref 1–4)
LYMPHOCYTES NFR BLD AUTO: 23.5 %
MCH RBC QN AUTO: 30.6 PG (ref 26–34)
MCHC RBC AUTO-ENTMCNC: 33.1 G/DL (ref 31–37)
MCV RBC AUTO: 92.5 FL
MICROALBUMIN UR-MCNC: <0.5 MG/DL
MONOCYTES # BLD AUTO: 0.63 X10(3) UL (ref 0.1–1)
MONOCYTES NFR BLD AUTO: 11 %
NEUTROPHILS # BLD AUTO: 3.5 X10 (3) UL (ref 1.5–7.7)
NEUTROPHILS # BLD AUTO: 3.5 X10(3) UL (ref 1.5–7.7)
NEUTROPHILS NFR BLD AUTO: 61.3 %
NONHDLC SERPL-MCNC: 164 MG/DL (ref ?–130)
OSMOLALITY SERPL CALC.SUM OF ELEC: 298 MOSM/KG (ref 275–295)
PLATELET # BLD AUTO: 165 10(3)UL (ref 150–450)
POTASSIUM SERPL-SCNC: 4.6 MMOL/L (ref 3.5–5.1)
PROT SERPL-MCNC: 7.4 G/DL (ref 6.4–8.2)
PSA SERPL-MCNC: 2.32 NG/ML (ref ?–4)
RBC # BLD AUTO: 5.06 X10(6)UL
SODIUM SERPL-SCNC: 143 MMOL/L (ref 136–145)
TRIGL SERPL-MCNC: 168 MG/DL (ref 30–149)
TSI SER-ACNC: 1.25 MIU/ML (ref 0.36–3.74)
VIT D+METAB SERPL-MCNC: 46.8 NG/ML (ref 30–100)
VLDLC SERPL CALC-MCNC: 31 MG/DL (ref 0–30)
WBC # BLD AUTO: 5.7 X10(3) UL (ref 4–11)

## 2023-10-28 PROCEDURE — 85652 RBC SED RATE AUTOMATED: CPT

## 2023-10-28 PROCEDURE — 84153 ASSAY OF PSA TOTAL: CPT

## 2023-10-28 PROCEDURE — 86225 DNA ANTIBODY NATIVE: CPT

## 2023-10-28 PROCEDURE — 80061 LIPID PANEL: CPT

## 2023-10-28 PROCEDURE — 36415 COLL VENOUS BLD VENIPUNCTURE: CPT

## 2023-10-28 PROCEDURE — 82043 UR ALBUMIN QUANTITATIVE: CPT

## 2023-10-28 PROCEDURE — 84443 ASSAY THYROID STIM HORMONE: CPT

## 2023-10-28 PROCEDURE — 80053 COMPREHEN METABOLIC PANEL: CPT

## 2023-10-28 PROCEDURE — 82306 VITAMIN D 25 HYDROXY: CPT

## 2023-10-28 PROCEDURE — 82570 ASSAY OF URINE CREATININE: CPT

## 2023-10-28 PROCEDURE — 86140 C-REACTIVE PROTEIN: CPT

## 2023-10-28 PROCEDURE — 85025 COMPLETE CBC W/AUTO DIFF WBC: CPT

## 2023-10-28 PROCEDURE — 86038 ANTINUCLEAR ANTIBODIES: CPT

## 2023-10-30 LAB
DSDNA IGG SERPL IA-ACNC: 0.7 IU/ML
ENA AB SER QL IA: 0.2 UG/L
ENA AB SER QL IA: NEGATIVE

## 2023-11-28 DIAGNOSIS — E78.00 HYPERCHOLESTEROLEMIA: ICD-10-CM

## 2023-11-28 NOTE — PROGRESS NOTES
Patient ID: Sandi Rudolph is a 63year old male. Patient presents with: Follow - Up: Vertigo, not feeling better. Back Pain: x3 days, unsure of trigger. HISTORY OF PRESENT ILLNESS:   HPI  Patient presents for above.   Here for follow-up of his v You're welcome Dr. Terry!   Oral Tab, Take 1 tablet (5 mg total) by mouth once daily. , Disp: 90 tablet, Rfl: 3  •  Pantoprazole Sodium 40 MG Oral Tab EC, Take 1 tablet (40 mg total) by mouth 2 (two) times daily. , Disp: 180 tablet, Rfl: 3  •  Fexofenadine-Pseudoephed ER (ALLEGRA-D) 60 regular rhythm and normal heart sounds. Pulmonary/Chest: Effort normal and breath sounds normal. No stridor. No respiratory distress.    Lymphadenopathy:        Head (right side): No submental, no submandibular, no tonsillar, no preauricular, no posterio

## 2023-11-29 RX ORDER — EZETIMIBE AND SIMVASTATIN 10; 20 MG/1; MG/1
TABLET ORAL
Qty: 90 TABLET | Refills: 3 | Status: SHIPPED | OUTPATIENT
Start: 2023-11-29

## 2023-11-29 NOTE — TELEPHONE ENCOUNTER
Please review; protocol failed.    Requested Prescriptions   Pending Prescriptions Disp Refills    EZETIMIBE-SIMVASTATIN 10-20 MG Oral Tab [Pharmacy Med Name: EZETIMIBE/SIMVASTATIN TABS 10/20MG] 90 tablet 3     Sig: TAKE 1 TABLET NIGHTLY AT BEDTIME       Cholesterol Medication Protocol Failed - 11/28/2023  7:47 AM        Failed - Last LDL < 130     Lab Results   Component Value Date     (H) 10/28/2023             Passed - ALT in past 12 months        Passed - LDL in past 12 months        Passed - Last ALT < 80     Lab Results   Component Value Date    ALT 42 10/28/2023             Passed - In person appointment or virtual visit in the past 12 mos or appointment in next 3 mos     Recent Outpatient Visits              1 month ago Annual physical exam    Yariel Desouza MD    Office Visit    8 months ago Essential hypertension    Sera Leigh MD    Office Visit    9 months ago Other synovitis and tenosynovitis, right hand    [address], [address], Sera Lopez MD    Office Visit    9 months ago Pain in right hand    [address], [address], Sera Lopez MD    Office Visit    11 months ago Crohn's disease involving terminal ileum [facility])    Colletta Peper, MD    Office Visit          Future Appointments         Provider Department Appt Notes    In 5 months Diogo Cid MD [address], [address] [facility] 6M FU                  Recent Outpatient Visits              1 month ago Annual physical exam    Sera Leigh MD    Office Visit    8 months ago Essential hypertension    Sera Leigh MD    Office Visit    9 months ago Other synovitis and tenosynovitis, right hand    Dee Ramirez, [address], Sera Becker MD    Office Visit    9 months ago Pain in right hand    Dee Ramirez, [address], Sera Becker MD    Office Visit    11 months ago Crohn's disease involving terminal ileum [facility])    Scott Klein MD    Office Visit          Future Appointments         Provider Department Appt Notes    In 5 months MD Steven [facility], [address] [facility]

## 2024-04-28 DIAGNOSIS — I10 ESSENTIAL HYPERTENSION: ICD-10-CM

## 2024-04-30 ENCOUNTER — OFFICE VISIT (OUTPATIENT)
Dept: INTERNAL MEDICINE CLINIC | Facility: CLINIC | Age: 64
End: 2024-04-30

## 2024-04-30 VITALS
BODY MASS INDEX: 27.92 KG/M2 | RESPIRATION RATE: 18 BRPM | HEART RATE: 64 BPM | HEIGHT: 70 IN | SYSTOLIC BLOOD PRESSURE: 134 MMHG | OXYGEN SATURATION: 96 % | WEIGHT: 195 LBS | DIASTOLIC BLOOD PRESSURE: 84 MMHG

## 2024-04-30 DIAGNOSIS — M13.0 POLYARTHRITIS: ICD-10-CM

## 2024-04-30 DIAGNOSIS — E78.00 PURE HYPERCHOLESTEROLEMIA: ICD-10-CM

## 2024-04-30 DIAGNOSIS — I10 ESSENTIAL HYPERTENSION: Primary | ICD-10-CM

## 2024-04-30 PROCEDURE — 3079F DIAST BP 80-89 MM HG: CPT | Performed by: INTERNAL MEDICINE

## 2024-04-30 PROCEDURE — 3075F SYST BP GE 130 - 139MM HG: CPT | Performed by: INTERNAL MEDICINE

## 2024-04-30 PROCEDURE — 3008F BODY MASS INDEX DOCD: CPT | Performed by: INTERNAL MEDICINE

## 2024-04-30 PROCEDURE — 96127 BRIEF EMOTIONAL/BEHAV ASSMT: CPT | Performed by: INTERNAL MEDICINE

## 2024-04-30 PROCEDURE — 99214 OFFICE O/P EST MOD 30 MIN: CPT | Performed by: INTERNAL MEDICINE

## 2024-04-30 RX ORDER — NEBIVOLOL 5 MG/1
5 TABLET ORAL EVERY MORNING
Qty: 90 TABLET | Refills: 3 | Status: SHIPPED | OUTPATIENT
Start: 2024-04-30

## 2024-04-30 NOTE — TELEPHONE ENCOUNTER
Refill passed per [facility] protocols.    Requested Prescriptions   Pending Prescriptions Disp Refills    NEBIVOLOL 5 MG Oral Tab [Pharmacy Med Name: NEBIVOLOL TABS 5MG] 90 tablet 3     Sig: TAKE 1 TABLET IN THE MORNING       Hypertension Medications Protocol Passed - 4/28/2024 11:44 PM        Passed - CMP or BMP in past 12 months        Passed - Last BP reading less than 140/90     BP Readings from Last 1 Encounters:   04/30/24 134/84               Passed - In person appointment or virtual visit in the past 12 mos or appointment in next 3 mos     Recent Outpatient Visits              [facility]    [facility]    6 months ago Annual physical exam    [facility]    [facility] Visit    1 year ago [facility]ential hypertension    [facility]    [facility]    1 year ago Other synovitis and tenosynovitis, right hand    [facility]    1 year ago [facility] in right hand    [facility]    [facility]          Future Appointments         Provider Department Appt Notes    In 6 months [facility]  [facility] 10/27/23                    Passed - EGFRCR or GFRNAA > 50     GFR Evaluation  EGFRCR: 64 , resulted on 10/28/2023

## 2024-04-30 NOTE — PROGRESS NOTES
Patient ID: Delmer Ro is a 64 year old male.  Chief Complaint   Patient presents with    Follow - Up     6 month follow up discuss test results 10/27        HISTORY OF PRESENT ILLNESS:   HPI  Patient presents for above.  Here for 6-month follow-up.    History of hypertension on medications.  Has gained several pounds since his last visit.  Compliant with medications.  No chest pain or shortness of breath with activities.    History of hypercholesterolemia.  Admittedly on his visit 6 months ago had not been taking his medications regularly.  His lipid panel did increase markedly from the one prior to that.  Since the past 6 months has been taking his medications on a regular basis.  No side effects from the medications.    6 months ago was complaining of polyarthralgias.  Began taking magnesium tablet on a daily basis and symptoms have resolved.  Not having any symptoms due to taking his statin on a daily basis.    Review of Systems   Constitutional: Negative.    HENT: Negative.     Eyes: Negative.    Respiratory: Negative.     Cardiovascular: Negative.    Gastrointestinal: Negative.    Endocrine: Negative.    Genitourinary: Negative.    Musculoskeletal: Negative.    Skin: Negative.    Allergic/Immunologic: Negative.    Neurological: Negative.    Hematological: Negative.    Psychiatric/Behavioral: Negative.       MEDICAL HISTORY:     Past Medical History:    Allergic rhinitis    Arthritis    Cancer (HCC)    Skin ca    Crohn disease (HCC)    Crohn's disease (HCC)    Colonoscopy     Esophageal reflux    Fatty liver    Hiatal hernia    High blood pressure    High cholesterol    Hyperlipidemia    Ileitis    Lipid screening    Pneumonia    Unspecified essential hypertension    Varicella zoster       Past Surgical History:   Procedure Laterality Date    Colonoscopy  2023    Colonoscopy & polypectomy  06/24/2014    Hernia surgery  1961    Repair     Other surgical history  01/01/2006    Per next gen, surgery for skin  cancer     Tonsillectomy  01/01/1986    Upper gi endoscopy,biopsy  06/24/2014    Vasectomy  1997         Current Outpatient Medications:     ezetimibe-simvastatin 10-20 MG Oral Tab, TAKE 1 TABLET NIGHTLY AT BEDTIME, Disp: 90 tablet, Rfl: 3    NEBIVOLOL 5 MG Oral Tab, TAKE 1 TABLET IN THE MORNING, Disp: 90 tablet, Rfl: 3    Cholecalciferol (VITAMIN D) 50 MCG (2000 UT) Oral Cap, Take by mouth., Disp: , Rfl:     Multiple Vitamin (MULTI-VITAMIN DAILY) Oral Tab, Take by mouth., Disp: , Rfl:     ondansetron 4 MG Oral Tablet Dispersible, Take 1 tablet (4 mg total) by mouth every 8 (eight) hours as needed for Nausea., Disp: 20 tablet, Rfl: 0    acetaminophen (TYLENOL EXTRA STRENGTH) 500 MG Oral Tab, Take by mouth., Disp: , Rfl:     ibuprofen 200 MG Oral Tab, Take by mouth., Disp: , Rfl:     Allergies:No Known Allergies    Social History     Socioeconomic History    Marital status:      Spouse name: Not on file    Number of children: Not on file    Years of education: Not on file    Highest education level: Not on file   Occupational History    Not on file   Tobacco Use    Smoking status: Never    Smokeless tobacco: Never   Vaping Use    Vaping status: Never Used   Substance and Sexual Activity    Alcohol use: Not Currently     Comment: Rarely; beer and liquor     Drug use: No    Sexual activity: Not on file   Other Topics Concern     Service Not Asked    Blood Transfusions Not Asked    Caffeine Concern Yes     Comment: Daily; one cup, tea     Occupational Exposure Not Asked    Hobby Hazards Not Asked    Sleep Concern Not Asked    Stress Concern Not Asked    Weight Concern Not Asked    Special Diet Not Asked    Back Care Not Asked    Exercise Not Asked    Bike Helmet Not Asked    Seat Belt Not Asked    Self-Exams Not Asked   Social History Narrative    Not on file     Social Determinants of Health     Financial Resource Strain: Not on file   Food Insecurity: Not on file   Transportation Needs: Not on file    Physical Activity: Not on file   Stress: Not on file   Social Connections: Not on file   Housing Stability: Not on file           PHYSICAL EXAM:     Vitals:    04/30/24 0852   BP: 134/84   Pulse: 64   Resp: 18   SpO2: 96%   Weight: 195 lb (88.5 kg)   Height: 5' 10\" (1.778 m)       Body mass index is 27.98 kg/m².    Physical Exam  Constitutional:       Appearance: Normal appearance.   Eyes:      General: No scleral icterus.  Cardiovascular:      Rate and Rhythm: Normal rate and regular rhythm.      Pulses: Normal pulses.      Heart sounds: Normal heart sounds. No murmur heard.  Pulmonary:      Effort: Pulmonary effort is normal. No respiratory distress.      Breath sounds: Normal breath sounds. No stridor. No wheezing or rhonchi.   Neurological:      Mental Status: He is alert.   Psychiatric:         Mood and Affect: Mood normal.         Behavior: Behavior normal.           ASSESSMENT/PLAN:   1. Essential hypertension  Well-controlled.  Continue medications.  Weight loss discussed.  Low-salt diet.    2. Pure hypercholesterolemia  Comp Metabolic Panel (14); Future  Lipid panel.    3. Polyarthritis  Continue magnesium tablets as needed.    Return in about 6 months (around 10/30/2024) for Complete physical.    This note was prepared using Dragon [employer] voice recognition dictation software. As a result errors may occur. When identified these errors have been corrected. While every attempt is made to correct errors during dictation discrepancies may still exist.    Avinash Valdovinos MD  4/30/2024

## 2024-05-01 ENCOUNTER — LAB ENCOUNTER (OUTPATIENT)
Dept: LAB | Age: 64
End: 2024-05-01
Attending: INTERNAL MEDICINE
Payer: COMMERCIAL

## 2024-05-01 DIAGNOSIS — E78.00 PURE HYPERCHOLESTEROLEMIA: ICD-10-CM

## 2024-05-01 LAB
ALBUMIN SERPL-MCNC: 4.3 G/DL (ref 3.2–4.8)
ALBUMIN/GLOB SERPL: 1.7 {RATIO} (ref 1–2)
ALP LIVER SERPL-CCNC: 77 U/L
ALT SERPL-CCNC: 28 U/L
ANION GAP SERPL CALC-SCNC: 6 MMOL/L (ref 0–18)
AST SERPL-CCNC: 33 U/L (ref ?–34)
BILIRUB SERPL-MCNC: 1.3 MG/DL (ref 0.2–1.1)
BUN BLD-MCNC: 20 MG/DL (ref 9–23)
BUN/CREAT SERPL: 15.5 (ref 10–20)
CALCIUM BLD-MCNC: 9.6 MG/DL (ref 8.7–10.4)
CHLORIDE SERPL-SCNC: 107 MMOL/L (ref 98–112)
CHOLEST SERPL-MCNC: 121 MG/DL (ref ?–200)
CO2 SERPL-SCNC: 29 MMOL/L (ref 21–32)
CREAT BLD-MCNC: 1.29 MG/DL
EGFRCR SERPLBLD CKD-EPI 2021: 62 ML/MIN/1.73M2 (ref 60–?)
FASTING PATIENT LIPID ANSWER: YES
FASTING STATUS PATIENT QL REPORTED: YES
GLOBULIN PLAS-MCNC: 2.6 G/DL (ref 2.8–4.4)
GLUCOSE BLD-MCNC: 91 MG/DL (ref 70–99)
HDLC SERPL-MCNC: 41 MG/DL (ref 40–59)
LDLC SERPL CALC-MCNC: 62 MG/DL (ref ?–100)
NONHDLC SERPL-MCNC: 80 MG/DL (ref ?–130)
OSMOLALITY SERPL CALC.SUM OF ELEC: 296 MOSM/KG (ref 275–295)
POTASSIUM SERPL-SCNC: 4.6 MMOL/L (ref 3.5–5.1)
PROT SERPL-MCNC: 6.9 G/DL (ref 5.7–8.2)
SODIUM SERPL-SCNC: 142 MMOL/L (ref 136–145)
TRIGL SERPL-MCNC: 94 MG/DL (ref 30–149)
VLDLC SERPL CALC-MCNC: 14 MG/DL (ref 0–30)

## 2024-05-01 PROCEDURE — 36415 COLL VENOUS BLD VENIPUNCTURE: CPT

## 2024-05-01 PROCEDURE — 80053 COMPREHEN METABOLIC PANEL: CPT

## 2024-05-01 PROCEDURE — 80061 LIPID PANEL: CPT

## 2024-09-19 ENCOUNTER — OFFICE VISIT (OUTPATIENT)
Dept: SURGERY | Facility: CLINIC | Age: 64
End: 2024-09-19

## 2024-09-19 ENCOUNTER — HOSPITAL ENCOUNTER (OUTPATIENT)
Dept: GENERAL RADIOLOGY | Facility: HOSPITAL | Age: 64
Discharge: HOME | End: 2024-09-19
Attending: PLASTIC SURGERY
Payer: COMMERCIAL

## 2024-09-19 DIAGNOSIS — M79.645 PAIN OF LEFT THUMB: ICD-10-CM

## 2024-09-19 DIAGNOSIS — M79.642 PAIN OF LEFT HAND: ICD-10-CM

## 2024-09-19 DIAGNOSIS — M79.645 PAIN OF LEFT THUMB: Primary | ICD-10-CM

## 2024-09-19 DIAGNOSIS — M18.12 PRIMARY OSTEOARTHRITIS OF FIRST CARPOMETACARPAL JOINT OF LEFT HAND: ICD-10-CM

## 2024-09-19 PROCEDURE — 99244 OFF/OP CNSLTJ NEW/EST MOD 40: CPT | Performed by: PLASTIC SURGERY

## 2024-09-19 PROCEDURE — 73140 X-RAY EXAM OF FINGER(S): CPT | Performed by: PLASTIC SURGERY

## 2024-09-19 NOTE — H&P
Delmer Ro is a 64 year old male that presents with   Chief Complaint   Patient presents with    Pain     L TH pain   .    REFERRED BY:  Avinash Valdovinos MD      Pacemaker: No  Latex Allergy: no  Coumadin: No  Plavix: No  Other anticoagulants: No  Cardiac stents: No    HAND DOMINANCE:  Right  Profession: retired     RECONSTRUCTIVE HISTORY    SUN EXPOSURE   Current no   Past yes   Sunburns yes   Tanning salons current no   Tanning salons past no   Radiation treatments No     SKIN CANCER    Personal history of skin cancer: melanoma    HAND     Previous hand injury (personal)    No      HPI:       Injury 1: L TH injury, seen 119 days postinjury  - Date: 05/23/24  - Days Since: 119      64-year-old male right-hand-dominant with left thumb pain    Caught in a dog leash with hyperextension injury    Did not seek medical care    Soreness in the thenar area with lifting or pulling    Trouble dressing, trouble with pinching and pulling maneuvers    Advil as needed does not help          Review of Systems:   Constitutional: No change in appetite, chill/rigors, or fatigue  GI: No jaundice  Endocrine: No generalized weakness  Neurological: No aphasia, loss of consciousness, or seizures    Musculoskeletal:     PAIN:  ONSET    4 months ago, injured L TH caught in dog leash, was hyperextended    Did not seek treatment    LOCATION:  left   thumb thenar area, radiates up L wrist  Constant sore, worsens w/ lifting or pulling    NIGHT SYMPTOMS:  No    FUNCTIONAL PROBLEMS: Yes pain while getting dressing    PREVIOUS TREATMENT Anti-inflammatories Advil as needed     Treatment helped?  No          PMH:     MEDICAL  Past Medical History:    Allergic rhinitis    Arthritis    Cancer (HCC)    Skin ca    Crohn disease (HCC)    Crohn's disease (HCC)    Colonoscopy     Esophageal reflux    Fatty liver    Hiatal hernia    High blood pressure    High cholesterol    Hyperlipidemia    Ileitis    Lipid screening    Pneumonia    Unspecified  essential hypertension    Varicella zoster        SURGICAL  Past Surgical History:   Procedure Laterality Date    Colonoscopy  2023    Colonoscopy & polypectomy  06/24/2014    Hernia surgery  1961    Repair     Other surgical history  01/01/2006    Per next gen, surgery for skin cancer     Tonsillectomy  01/01/1986    Upper gi endoscopy,biopsy  06/24/2014    Vasectomy  1997        ALLERIGIES  No Known Allergies     MEDICATIONS  Current Outpatient Medications   Medication Sig Dispense Refill    nebivolol 5 MG Oral Tab Take 1 tablet (5 mg total) by mouth every morning. 90 tablet 3    ezetimibe-simvastatin 10-20 MG Oral Tab TAKE 1 TABLET NIGHTLY AT BEDTIME 90 tablet 3    Cholecalciferol (VITAMIN D) 50 MCG (2000 UT) Oral Cap Take by mouth.      Multiple Vitamin (MULTI-VITAMIN DAILY) Oral Tab Take by mouth.      ondansetron 4 MG Oral Tablet Dispersible Take 1 tablet (4 mg total) by mouth every 8 (eight) hours as needed for Nausea. (Patient not taking: Reported on 9/19/2024) 20 tablet 0    acetaminophen (TYLENOL EXTRA STRENGTH) 500 MG Oral Tab Take by mouth. (Patient not taking: Reported on 9/19/2024)      ibuprofen 200 MG Oral Tab Take by mouth. (Patient not taking: Reported on 9/19/2024)          SOCIAL HISTORY  Social History     Socioeconomic History    Marital status:    Tobacco Use    Smoking status: Never    Smokeless tobacco: Never   Vaping Use    Vaping status: Never Used   Substance and Sexual Activity    Alcohol use: Not Currently     Comment: Rarely; beer and liquor     Drug use: No   Other Topics Concern    Caffeine Concern Yes     Comment: Daily; one cup, tea         FAMILY HISTORY  Family History   Problem Relation Age of Onset    Hypertension Mother     Heart Disease Mother     Stroke Mother     Skin cancer Mother     Cancer Father         Lung ca    Other (Other[other]) Father 85        Lung Cancer    Heart Disease Brother         Coronary Artery Disease           PHYSICAL EXAM:     CONSTITUTIONAL:  Overall appearance - Normal  HEENT: Normocephalic  EYES: Conjunctiva - Right: Normal, Left: Normal; EOMI  EARS: Inspection - Right: Normal, Left: Normal  NECK/THYROID: Inspection - Normal, Palpation - Normal, Thyroid gland - Normal, No adenopathy  RESPIRATORY: Inspection - Normal, Effort - Normal  CARDIOVASCULAR: Regular rhythm, No murmurs  ABDOMEN: Inspection - Normal, No abdominal tenderness  NEURO: Memory intact  PSYCH: Oriented to person, place, time, and situation, Appropriate mood and affect      Hand Physical Exam:     Left thumb CMC dorsal subluxation  No dorsal tenderness  Volar tenderness  Crepitation/grind negative  Traction pressure positive    Finkelstein negative  First dorsal compartment nontender      X-ray independently interpreted: Arthritic changes CMC, MP, IP      IMPRESSION:        Arthritis  thumb CMC Left    We discussed what arthritis is, including treatment options.  Arthritis is not curable.  Treatment is designed to try to improve symptoms and function, but this is not always possible.  Arthritis may require multiple treatments over a long period of time, and symptoms may not go away completely. Surgery is sometimes necessary.  Questions were answered and the patient wishes to proceed with treatment.    Splint - I would recommend a leydi splint.  This may not relieve the symptoms.     If symptoms are not relieved, I would recommend the patient see physiatry       ASSESSMENT/PLAN:     No diagnosis found.      9/19/2024  Bryon Ortiz MD        +++++++++++++++++++++++++++++++++++++++++      MEDICAL DECISION MAKING    PROBLEMS      MODERATE    (number / complexity)          Acute complicated injury    DATA         MODERATE    (amount / complexity)          X-rays independently reviewed    MANAGEMENT RISK  LOW    (complications/ morbidity)       Splint/OT                  MDM LEVEL    MODERATE

## 2024-11-20 ENCOUNTER — OFFICE VISIT (OUTPATIENT)
Dept: INTERNAL MEDICINE CLINIC | Facility: CLINIC | Age: 64
End: 2024-11-20

## 2024-11-20 VITALS
RESPIRATION RATE: 18 BRPM | HEIGHT: 70 IN | DIASTOLIC BLOOD PRESSURE: 82 MMHG | HEART RATE: 60 BPM | BODY MASS INDEX: 28.06 KG/M2 | SYSTOLIC BLOOD PRESSURE: 128 MMHG | WEIGHT: 196 LBS | TEMPERATURE: 97 F

## 2024-11-20 DIAGNOSIS — I10 ESSENTIAL HYPERTENSION: ICD-10-CM

## 2024-11-20 DIAGNOSIS — E78.00 PURE HYPERCHOLESTEROLEMIA: ICD-10-CM

## 2024-11-20 DIAGNOSIS — Z00.00 ANNUAL PHYSICAL EXAM: Primary | ICD-10-CM

## 2024-11-20 DIAGNOSIS — K76.0 FATTY LIVER: ICD-10-CM

## 2024-11-20 DIAGNOSIS — K50.00 CROHN'S DISEASE INVOLVING TERMINAL ILEUM (HCC): ICD-10-CM

## 2024-11-20 DIAGNOSIS — M13.0 POLYARTHRITIS: ICD-10-CM

## 2024-11-20 DIAGNOSIS — Z12.11 COLON CANCER SCREENING: ICD-10-CM

## 2024-11-20 DIAGNOSIS — K50.00 CROHN'S DISEASE OF SMALL INTESTINE WITHOUT COMPLICATION (HCC): ICD-10-CM

## 2024-11-20 PROCEDURE — 99396 PREV VISIT EST AGE 40-64: CPT | Performed by: INTERNAL MEDICINE

## 2024-11-20 PROCEDURE — 3074F SYST BP LT 130 MM HG: CPT | Performed by: INTERNAL MEDICINE

## 2024-11-20 PROCEDURE — 3079F DIAST BP 80-89 MM HG: CPT | Performed by: INTERNAL MEDICINE

## 2024-11-20 PROCEDURE — 3008F BODY MASS INDEX DOCD: CPT | Performed by: INTERNAL MEDICINE

## 2024-11-20 PROCEDURE — 99213 OFFICE O/P EST LOW 20 MIN: CPT | Performed by: INTERNAL MEDICINE

## 2024-11-20 NOTE — PATIENT INSTRUCTIONS
Prevention Guidelines, Men Ages 50 to 64  Screening tests and vaccines are an important part of managing your health. A screening test is done to find possible disorders or diseases in people who don't have any symptoms. The goal is to find a disease early so lifestyle changes can be made and you can be watched more closely to reduce the risk of disease, or to detect it early enough to treat it most effectively. Screening tests are not considered diagnostic, but are used to determine if more testing is needed. Health counseling is essential, too. Below are guidelines for these, for men ages 50 to 64. Talk with your healthcare provider to make sure you’re up-to-date on what you need.  Screening Who needs it How often   Alcohol misuse All men in this age group At routine exams   Blood pressure All men in this age group Yearly checkup if your blood pressure is normal  Normal blood pressure is less than 120/80 mm Hg  If your blood pressure reading is higher than normal, follow the advice of your healthcare provider      Colorectal cancer All men in this age group Flexible sigmoidoscopy every 5 years, or colonoscopy every 10 years, or double-contrast barium enema every 5 years; yearly fecal occult blood test or fecal immunochemical test; or a stool DNA test as often as your healthcare provider advises; talk with your healthcare provider about which tests are best for you   Depression All men in this age group At routine exams   Type 2 diabetes or prediabetes All men beginning at age 45 and men without symptoms at any age who are overweight or obese and have 1 or more other risk factors for diabetes At least every 3 years (yearly if your blood sugar has already begun to rise)   Type 2 diabetes All men with prediabetes Every year   Hepatitis C Men at increased risk for infection - talk with your healthcare provider At routine exams. All men ages 50 to 70 should be tested at least once for hepatitis C.   High cholesterol or  triglycerides All men in this age group At least every 5 years   HIV Men at increased risk for infection - talk with your healthcare provider At routine exams   Lung cancer Adults age 55 to 80 who have smoked Yearly screening in smokers with 30 pack-year history of smoking or who quit within 15 years   Obesity All men in this age group At routine exams   Prostate cancer Starting at age 45, talk to healthcare provider about risks and benefits of digital rectal exam (KIMBERLEY) and prostate-specific antigen (PSA) screening1 At routine exams   Syphilis Men at increased risk for infection - talk with your healthcare provider At routine exams   Tuberculosis Men at increased risk for infection - talk with your healthcare provider Ask your healthcare provider   Vision All men in this age group Ask your healthcare provider   Vaccine Who needs it How often   Chickenpox (varicella) All men in this age group who have no record of this infection or vaccine 2 doses; second dose should be given at least 4 weeks after the first dose   Hepatitis A Men at increased risk for infection - talk with your healthcare provider 2 doses given at least 6 months apart   Hepatitis B Men at increased risk for infection - talk with your healthcare provider 3 doses over 6 months; second dose should be given 1 month after the first dose; the third dose should be given at least 2 months after the second dose and at least 4 months after the first dose   Haemophilus influenzae Type B (HIB) Men at increased risk for infection - talk with your healthcare provider 1 to 3 doses   Influenza (flu) All men in this age group Once a year   Measles, mumps, rubella (MMR) Men in this age group through their late 50s who have no record of these infections or vaccines 1 or 2 doses; ask your healthcare provider   Meningococcal Men at increased risk for infection - talk with your healthcare provider 1 or more doses   Pneumococcal conjugate vaccine (PCV13) and pneumococcal  polysaccharide vaccine (PPSV23) Men at increased risk for infection - talk with your healthcare provider PCV13: 1 dose ages 19 to 65 (protects against 13 types of pneumococcal bacteria)     PPSV23: 1 to 2 doses through age 64, or 1 dose at 65 or older (protects against 23 types of pneumococcal bacteria)      Tetanus/diphtheria/  pertussis (Td/Tdap) booster All men in this age group Td every 10 years, or a one-time dose of Tdap instead of a Td booster after age 18, then Td every 10 years   Zoster All men ages 60 and older 1 dose   Counseling Who needs it How often   Diet and exercise Men who are overweight or obese When diagnosed, and then at routine exams   Sexually transmitted infection prevention Men at increased risk for infection - talk with your healthcare provider At routine exams   Use of daily aspirin Men in this age group at risk for cardiovascular health problems At routine exams   Use of tobacco and the health effects it can cause All men in this age group Every visit   [address] [place] [address] [facility]  Date Last Reviewed: 2/1/2017  © 7942-6456 [employer]. [address] [place] [address], [place] [address]. All rights reserved. This information is not intended as a substitute for professional medical care. Always follow your healthcare professional's instructions.

## 2024-11-20 NOTE — PROGRESS NOTES
Patient ID: Delmer Ro is a 64 year old male.  Chief Complaint   Patient presents with    Physical        HISTORY OF PRESENT ILLNESS:   HPI  Patient presents for above.  Here for annual physical and to discuss multiple medical issues.     History of high blood pressure on medication.  Tolerating well.  Readings at home are normal.  No chest pain, shortness of breath, headaches, visual changes, numbness, tingling.     History of hypercholesterolemia.  He is currently taking a combination of Zetia and simvastatin.  Needs levels rechecked.    History of Crohn's disease.  Sees his GI doctor on a yearly basis.  No changes in bowel patterns.  No hematochezia or melena.  Denies nausea or vomiting.  No unexpected weight changes.  Did have a colonoscopy in 2023 with repeat to be done in 2028.     History of fatty liver.  Liver tests are fine.  Has seen his GI doctor and was told this was okay and no further work-up needed.    Complaining of myalgias and polyarthritis for several years.  Occurs in his shoulders and hips as well as his thighs.  Occurs after doing extensive activities.  No rashes.  Does have a history of Crohn's as above.  Had autoimmune workup done in 2023 and was normal.     He does not urinate in the middle the night.  Had colonoscopy in 2023 with repeat to be done in 2028.    Review of Systems  Ten point review of systems otherwise negative with the exception of HPI and assessment and plan.    MEDICAL HISTORY:     Past Medical History:    Allergic rhinitis    Arthritis    Cancer (HCC)    Skin ca    Crohn disease (HCC)    Crohn's disease (HCC)    Colonoscopy     Esophageal reflux    Fatty liver    Hiatal hernia    High blood pressure    High cholesterol    Hyperlipidemia    Ileitis    Lipid screening    Pneumonia    Unspecified essential hypertension    Varicella zoster       Past Surgical History:   Procedure Laterality Date    Colonoscopy  2023    Colonoscopy & polypectomy  06/24/2014    Hernia  surgery  1961    Repair     Other surgical history  01/01/2006    Per next gen, surgery for skin cancer     Tonsillectomy  01/01/1986    Upper gi endoscopy,biopsy  06/24/2014    Vasectomy  1997         Current Outpatient Medications:     nebivolol 5 MG Oral Tab, Take 1 tablet (5 mg total) by mouth every morning., Disp: 90 tablet, Rfl: 3    ezetimibe-simvastatin 10-20 MG Oral Tab, TAKE 1 TABLET NIGHTLY AT BEDTIME, Disp: 90 tablet, Rfl: 3    Cholecalciferol (VITAMIN D) 50 MCG (2000 UT) Oral Cap, Take by mouth., Disp: , Rfl:     Multiple Vitamin (MULTI-VITAMIN DAILY) Oral Tab, Take by mouth., Disp: , Rfl:     ondansetron 4 MG Oral Tablet Dispersible, Take 1 tablet (4 mg total) by mouth every 8 (eight) hours as needed for Nausea., Disp: 20 tablet, Rfl: 0    acetaminophen (TYLENOL EXTRA STRENGTH) 500 MG Oral Tab, Take by mouth., Disp: , Rfl:     ibuprofen 200 MG Oral Tab, Take by mouth., Disp: , Rfl:     Allergies:Allergies[1]    Social History     Socioeconomic History    Marital status:      Spouse name: Not on file    Number of children: Not on file    Years of education: Not on file    Highest education level: Not on file   Occupational History    Not on file   Tobacco Use    Smoking status: Never    Smokeless tobacco: Never   Vaping Use    Vaping status: Never Used   Substance and Sexual Activity    Alcohol use: Not Currently     Comment: Rarely; beer and liquor     Drug use: No    Sexual activity: Yes     Partners: Female   Other Topics Concern     Service Not Asked    Blood Transfusions Not Asked    Caffeine Concern Yes     Comment: Daily; one cup, tea     Occupational Exposure Not Asked    Hobby Hazards Not Asked    Sleep Concern Not Asked    Stress Concern Not Asked    Weight Concern Not Asked    Special Diet Not Asked    Back Care Not Asked    Exercise Not Asked    Bike Helmet Not Asked    Seat Belt Not Asked    Self-Exams Not Asked   Social History Narrative    Not on file     Social Drivers of  Health     Financial Resource Strain: Not on file   Food Insecurity: Not on file   Transportation Needs: Not on file   Physical Activity: Not on file   Stress: Not on file   Social Connections: Not on file   Housing Stability: Not on file           PHYSICAL EXAM:     Vitals:    11/20/24 0847 11/20/24 0909   BP: 142/80 128/82   Pulse: 60    Resp: 18    Temp: 97 °F (36.1 °C)    TempSrc: Temporal    Weight: 196 lb (88.9 kg)    Height: 5' 10\" (1.778 m)        Body mass index is 28.12 kg/m².    Physical Exam  Constitutional:       Appearance: Normal appearance. He is well-developed.   HENT:      Head: Normocephalic.      Right Ear: Tympanic membrane, ear canal and external ear normal. There is no impacted cerumen.      Left Ear: Tympanic membrane, ear canal and external ear normal. There is no impacted cerumen.   Eyes:      General: No scleral icterus.     Pupils: Pupils are equal, round, and reactive to light.   Neck:      Vascular: No JVD.   Cardiovascular:      Rate and Rhythm: Normal rate and regular rhythm.      Pulses: Normal pulses.      Heart sounds: Normal heart sounds. No murmur heard.  Pulmonary:      Effort: Pulmonary effort is normal. No respiratory distress.      Breath sounds: Normal breath sounds. No stridor. No wheezing, rhonchi or rales.   Chest:      Chest wall: No tenderness.   Abdominal:      General: Abdomen is flat. Bowel sounds are normal. There is no distension.      Palpations: Abdomen is soft.      Tenderness: There is no abdominal tenderness. There is no guarding or rebound.   Musculoskeletal:         General: Normal range of motion.      Cervical back: Normal range of motion.   Lymphadenopathy:      Cervical: No cervical adenopathy.   Skin:     General: Skin is warm.   Neurological:      General: No focal deficit present.      Mental Status: He is alert.      Cranial Nerves: No cranial nerve deficit.   Psychiatric:         Mood and Affect: Mood normal.         Behavior: Behavior normal.            ASSESSMENT/PLAN:   1. Annual physical exam  CBC With Differential With Platelet; Future  Comp Metabolic Panel (14); Future  Lipid Panel; Future  TSH W Reflex To Free T4; Future  PSA Total, Diagnostic; Future    2. Essential hypertension  Microalb/Creat Ratio, Random Urine; Future  Recheck was normal.    3. Pure hypercholesterolemia  Comp Metabolic Panel (14); Future  Lipid Panel; Future    4. Crohn's disease involving terminal ileum (HCC)  Stable.  Follow-up with gastroenterology.    5. Crohn's disease of small intestine without complication (HCC)  As per #4.    6. Fatty liver  Comp Metabolic Panel (14); Future    7. Polyarthritis  Likely osteoarthritis.  Recommended that he try glucosamine/chondroitin.    8. Colon cancer screening  Repeat colonoscopy in 2028.    Return in about 6 months (around 5/20/2025) for Routine Follow-Up.    This note was prepared using [employer] voice recognition dictation software. As a result errors may occur. When identified these errors have been corrected. While every attempt is made to correct errors during dictation discrepancies may still exist.    Avinash Valdovinos MD  11/20/2024       [1] No Known Allergies

## 2024-11-21 ENCOUNTER — LAB ENCOUNTER (OUTPATIENT)
Dept: LAB | Age: 64
End: 2024-11-21
Attending: INTERNAL MEDICINE
Payer: COMMERCIAL

## 2024-11-21 DIAGNOSIS — Z00.00 ANNUAL PHYSICAL EXAM: ICD-10-CM

## 2024-11-21 DIAGNOSIS — E78.00 PURE HYPERCHOLESTEROLEMIA: ICD-10-CM

## 2024-11-21 DIAGNOSIS — I10 ESSENTIAL HYPERTENSION: ICD-10-CM

## 2024-11-21 DIAGNOSIS — K76.0 FATTY LIVER: ICD-10-CM

## 2024-11-21 LAB
ALBUMIN SERPL-MCNC: 4.6 G/DL (ref 3.2–4.8)
ALBUMIN/GLOB SERPL: 1.8 {RATIO} (ref 1–2)
ALP LIVER SERPL-CCNC: 89 U/L
ALT SERPL-CCNC: 41 U/L
ANION GAP SERPL CALC-SCNC: 6 MMOL/L (ref 0–18)
AST SERPL-CCNC: 38 U/L (ref ?–34)
BASOPHILS # BLD AUTO: 0.04 X10(3) UL (ref 0–0.2)
BASOPHILS NFR BLD AUTO: 0.6 %
BILIRUB SERPL-MCNC: 1.5 MG/DL (ref 0.2–1.1)
BUN BLD-MCNC: 19 MG/DL (ref 9–23)
BUN/CREAT SERPL: 15.7 (ref 10–20)
CALCIUM BLD-MCNC: 10 MG/DL (ref 8.7–10.4)
CHLORIDE SERPL-SCNC: 107 MMOL/L (ref 98–112)
CHOLEST SERPL-MCNC: 159 MG/DL (ref ?–200)
CO2 SERPL-SCNC: 29 MMOL/L (ref 21–32)
CREAT BLD-MCNC: 1.21 MG/DL
CREAT UR-SCNC: 98.9 MG/DL
DEPRECATED RDW RBC AUTO: 43.4 FL (ref 35.1–46.3)
EGFRCR SERPLBLD CKD-EPI 2021: 67 ML/MIN/1.73M2 (ref 60–?)
EOSINOPHIL # BLD AUTO: 0.15 X10(3) UL (ref 0–0.7)
EOSINOPHIL NFR BLD AUTO: 2.4 %
ERYTHROCYTE [DISTWIDTH] IN BLOOD BY AUTOMATED COUNT: 12.7 % (ref 11–15)
FASTING PATIENT LIPID ANSWER: YES
FASTING STATUS PATIENT QL REPORTED: YES
GLOBULIN PLAS-MCNC: 2.6 G/DL (ref 2–3.5)
GLUCOSE BLD-MCNC: 96 MG/DL (ref 70–99)
HCT VFR BLD AUTO: 46.4 %
HDLC SERPL-MCNC: 44 MG/DL (ref 40–59)
HGB BLD-MCNC: 16.1 G/DL
IMM GRANULOCYTES # BLD AUTO: 0.02 X10(3) UL (ref 0–1)
IMM GRANULOCYTES NFR BLD: 0.3 %
LDLC SERPL CALC-MCNC: 88 MG/DL (ref ?–100)
LYMPHOCYTES # BLD AUTO: 1.8 X10(3) UL (ref 1–4)
LYMPHOCYTES NFR BLD AUTO: 28.8 %
MCH RBC QN AUTO: 32.3 PG (ref 26–34)
MCHC RBC AUTO-ENTMCNC: 34.7 G/DL (ref 31–37)
MCV RBC AUTO: 93.2 FL
MICROALBUMIN UR-MCNC: <0.3 MG/DL
MONOCYTES # BLD AUTO: 0.62 X10(3) UL (ref 0.1–1)
MONOCYTES NFR BLD AUTO: 9.9 %
NEUTROPHILS # BLD AUTO: 3.61 X10 (3) UL (ref 1.5–7.7)
NEUTROPHILS # BLD AUTO: 3.61 X10(3) UL (ref 1.5–7.7)
NEUTROPHILS NFR BLD AUTO: 58 %
NONHDLC SERPL-MCNC: 115 MG/DL (ref ?–130)
OSMOLALITY SERPL CALC.SUM OF ELEC: 296 MOSM/KG (ref 275–295)
PLATELET # BLD AUTO: 178 10(3)UL (ref 150–450)
POTASSIUM SERPL-SCNC: 4.1 MMOL/L (ref 3.5–5.1)
PROT SERPL-MCNC: 7.2 G/DL (ref 5.7–8.2)
PSA SERPL-MCNC: 1.73 NG/ML (ref ?–4)
RBC # BLD AUTO: 4.98 X10(6)UL
SODIUM SERPL-SCNC: 142 MMOL/L (ref 136–145)
TRIGL SERPL-MCNC: 152 MG/DL (ref 30–149)
TSI SER-ACNC: 1.6 UIU/ML (ref 0.55–4.78)
VLDLC SERPL CALC-MCNC: 24 MG/DL (ref 0–30)
WBC # BLD AUTO: 6.2 X10(3) UL (ref 4–11)

## 2024-11-21 PROCEDURE — 82043 UR ALBUMIN QUANTITATIVE: CPT

## 2024-11-21 PROCEDURE — 85025 COMPLETE CBC W/AUTO DIFF WBC: CPT

## 2024-11-21 PROCEDURE — 84153 ASSAY OF PSA TOTAL: CPT

## 2024-11-21 PROCEDURE — 80053 COMPREHEN METABOLIC PANEL: CPT

## 2024-11-21 PROCEDURE — 82570 ASSAY OF URINE CREATININE: CPT

## 2024-11-21 PROCEDURE — 84443 ASSAY THYROID STIM HORMONE: CPT

## 2024-11-21 PROCEDURE — 36415 COLL VENOUS BLD VENIPUNCTURE: CPT

## 2024-11-21 PROCEDURE — 80061 LIPID PANEL: CPT

## 2024-12-03 DIAGNOSIS — E78.00 HYPERCHOLESTEROLEMIA: ICD-10-CM

## 2024-12-05 RX ORDER — EZETIMIBE AND SIMVASTATIN 10; 20 MG/1; MG/1
TABLET ORAL
Qty: 90 TABLET | Refills: 3 | Status: SHIPPED | OUTPATIENT
Start: 2024-12-05

## 2024-12-06 NOTE — TELEPHONE ENCOUNTER
Refill passed per [facility] protocol.     Requested Prescriptions   Pending Prescriptions Disp Refills    EZETIMIBE-SIMVASTATIN 10-20 MG Oral Tab [Pharmacy Med Name: EZETIMIBE/SIMVASTATIN TABS 10/20MG] 90 tablet 3     Sig: TAKE 1 TABLET NIGHTLY AT BEDTIME       Cholesterol Medication Protocol Passed - 12/5/2024  6:40 PM        Passed - ALT < 80     Lab Results   Component Value Date    ALT 41 11/21/2024             Passed - ALT resulted within past year        Passed - Lipid panel within past 12 months     Lab Results   Component Value Date    CHOLEST 159 11/21/2024    TRIG 152 (H) 11/21/2024    HDL 44 11/21/2024    LDL 88 11/21/2024    VLDL 24 11/21/2024    NONHDLC 115 11/21/2024             Passed - In person appointment or virtual visit in the past 12 mos or appointment in next 3 mos     Recent Outpatient Visits              2 weeks ago Annual physical exam    [facility], Avinash Ruiz MD    Office Visit    2 months ago Pain of left thumb    [facility], Bryon Davila MD    Office Visit    7 months ago [facility]    [facility]    1 year ago [facility]    [facility]    1 year ago [facility]ential [facility]    [facility] Visit

## 2025-04-24 DIAGNOSIS — I10 ESSENTIAL HYPERTENSION: ICD-10-CM

## 2025-04-28 RX ORDER — NEBIVOLOL 5 MG/1
5 TABLET ORAL EVERY MORNING
Qty: 90 TABLET | Refills: 3 | Status: SHIPPED | OUTPATIENT
Start: 2025-04-28

## 2025-04-28 NOTE — TELEPHONE ENCOUNTER
Refill Per Protocol     Requested Prescriptions   Pending Prescriptions Disp Refills    NEBIVOLOL 5 MG Oral Tab [Pharmacy Med Name: NEBIVOLOL TABS 5MG] 90 tablet 3     Sig: TAKE 1 TABLET EVERY MORNING       Hypertension Medications Protocol Passed - 4/28/2025 11:47 AM        Passed - CMP or BMP in past 12 months        Passed - Last BP reading less than 140/90     BP Readings from Last 1 Encounters:   11/20/24 128/82               Passed - In person appointment or virtual visit in the past 12 mos or appointment in next 3 mos     Recent Outpatient Visits              5 months ago Annual [facility]    [facility]    7 months [facility]    [facility]    12 months [facility]    [facility]    1 year ago [facility]    [facility]    2 years ago [facility]ential hypertension    [facility]                      Passed - EGFRCR or GFRNAA > 50     GFR Evaluation  EGFRCR: 67 , resulted on 11/21/2024          Passed - Medication is active on med list

## 2025-05-08 NOTE — PATIENT INSTRUCTIONS
1.  Take meclezine 12.5mg every 8 hours as needed for symptoms. 2.  Continue taking Allegra. Dizziness (Uncertain Cause)  Dizziness is a common symptom. It may be described as lightheadedness, spinning, or feeling like you are going to faint.  Dizziness c © 8049-0927 [employer] [address]. [address] [facility], [facility] [place]. All rights reserved. This information is not intended as a substitute for professional medical care. Always follow your healthcare professional's instructions. Detail Level: Detailed General Sunscreen Counseling: I recommended a broad spectrum sunscreen with a SPF of 30 or higher.  I explained that SPF 30 sunscreens block approximately 97 percent of the sun's harmful rays.  Sunscreens should be applied at least 15 minutes prior to expected sun exposure and then every 2 hours after that as long as sun exposure continues. If swimming or exercising sunscreen should be reapplied every 45 minutes to an hour after getting wet or sweating.  One ounce, or the equivalent of a shot glass full of sunscreen, is adequate to protect the skin not covered by a bathing suit. I also recommended a lip balm with a sunscreen as well. Sun protective clothing can be used in lieu of sunscreen but must be worn the entire time you are exposed to the sun's rays.

## 2025-07-01 ENCOUNTER — OFFICE VISIT (OUTPATIENT)
Dept: INTERNAL MEDICINE CLINIC | Facility: CLINIC | Age: 65
End: 2025-07-01
Payer: COMMERCIAL

## 2025-07-01 VITALS
TEMPERATURE: 98 F | HEIGHT: 70 IN | RESPIRATION RATE: 16 BRPM | HEART RATE: 67 BPM | SYSTOLIC BLOOD PRESSURE: 106 MMHG | BODY MASS INDEX: 28.12 KG/M2 | OXYGEN SATURATION: 95 % | DIASTOLIC BLOOD PRESSURE: 76 MMHG | WEIGHT: 196.38 LBS

## 2025-07-01 DIAGNOSIS — R60.0 BILATERAL LOWER EXTREMITY EDEMA: Primary | ICD-10-CM

## 2025-07-01 PROCEDURE — 99213 OFFICE O/P EST LOW 20 MIN: CPT | Performed by: INTERNAL MEDICINE

## 2025-07-01 PROCEDURE — 3008F BODY MASS INDEX DOCD: CPT | Performed by: INTERNAL MEDICINE

## 2025-07-01 PROCEDURE — 3074F SYST BP LT 130 MM HG: CPT | Performed by: INTERNAL MEDICINE

## 2025-07-01 PROCEDURE — G2211 COMPLEX E/M VISIT ADD ON: HCPCS | Performed by: INTERNAL MEDICINE

## 2025-07-01 PROCEDURE — 3078F DIAST BP <80 MM HG: CPT | Performed by: INTERNAL MEDICINE

## 2025-07-01 NOTE — PROGRESS NOTES
The following individual(s) verbally consented to be recorded using ambient AI listening technology and understand that they can each withdraw their consent to this listening technology at any point by asking the clinician to turn off or pause the recording:    Patient name: Delmer MCKEON Jayjay  Additional names:  n/a

## 2025-07-01 NOTE — PROGRESS NOTES
Patient ID: Delmer Ro is a 65 year old male.  Chief Complaint   Patient presents with    Leg Pain    Leg Swelling     Both legs        HISTORY OF PRESENT ILLNESS:   HPI  History of Present Illness  Delmer Ro is a 65 year old male who presents with bilateral leg swelling and pain.    He has been experiencing swelling in both legs, particularly noticeable when he is up and walking. The swelling causes a tight feeling in his legs and has been occurring over the last few months. The swelling resolves by morning with rest.    The pain in his legs worsens with activity, such as working on his house or hiking, and improves with rest. By the end of such activities, his legs are 'absolutely killing' him.    No weight gain, difficulty lying flat, waking up gasping for air, abdominal pain, or increased abdominal girth. No chest pain, shortness of breath, redness, or coldness in his legs. He recalls having pain in the back of his leg months ago but does not provide further details.    Review of Systems  Ten point review of systems otherwise negative with the exception of HPI and assessment and plan.    MEDICAL HISTORY:   Past Medical History[1]    Past Surgical History[2]    Medications - Current[3]    Allergies:Allergies[4]    Social History     Socioeconomic History    Marital status:      Spouse name: Not on file    Number of children: Not on file    Years of education: Not on file    Highest education level: Not on file   Occupational History    Not on file   Tobacco Use    Smoking status: Never    Smokeless tobacco: Never   Vaping Use    Vaping status: Never Used   Substance and Sexual Activity    Alcohol use: Not Currently     Comment: Rarely; beer and liquor     Drug use: No    Sexual activity: Yes     Partners: Female   Other Topics Concern     Service Not Asked    Blood Transfusions Not Asked    Caffeine Concern Yes     Comment: Daily; one cup, tea     Occupational Exposure Not Asked     Hobby Hazards Not Asked    Sleep Concern Not Asked    Stress Concern Not Asked    Weight Concern Not Asked    Special Diet Not Asked    Back Care Not Asked    Exercise Not Asked    Bike Helmet Not Asked    Seat Belt Not Asked    Self-Exams Not Asked   Social History Narrative    Not on file     Social Drivers of Health     Food Insecurity: Not on file   Transportation Needs: Not on file   Stress: Not on file   Housing Stability: Not on file           PHYSICAL EXAM:     Vitals:    07/01/25 1238   BP: 106/76   Pulse: 67   Resp: 16   Temp: 97.9 °F (36.6 °C)   TempSrc: Temporal   SpO2: 95%   Weight: 196 lb 6.4 oz (89.1 kg)   Height: 5' 10\" (1.778 m)       Body mass index is 28.18 kg/m².    Physical Exam  Constitutional:       Appearance: Normal appearance.   Eyes:      General: No scleral icterus.  Cardiovascular:      Rate and Rhythm: Normal rate and regular rhythm.      Pulses: Normal pulses.      Heart sounds: Normal heart sounds. No murmur heard.  Pulmonary:      Effort: Pulmonary effort is normal. No respiratory distress.      Breath sounds: Normal breath sounds. No stridor. No wheezing or rhonchi.   Musculoskeletal:        Legs:    Neurological:      Mental Status: He is alert.           ASSESSMENT/PLAN:   1. Bilateral lower extremity edema  Likely venous insufficiency.  Wear knee high compression socks when active.    Return if symptoms worsen or fail to improve.    This note was prepared using Dragon Medical voice recognition dictation software. As a result errors may occur. When identified these errors have been corrected. While every attempt is made to correct errors during dictation discrepancies may still exist.    Avinash Valdovinos MD  7/1/2025       [1]   Past Medical History:   Allergic rhinitis    Arthritis    Cancer (HCC)    Skin ca    Crohn disease (HCC)    Crohn's disease (HCC)    Colonoscopy     Esophageal reflux    Fatty liver    Hiatal hernia    High blood pressure    High cholesterol    Hyperlipidemia     Ileitis    Lipid screening    Pneumonia    Unspecified essential hypertension    Varicella zoster   [2]   Past Surgical History:  Procedure Laterality Date    Colonoscopy  2023    Colonoscopy & polypectomy  06/24/2014    Hernia surgery  1961    Repair     Other surgical history  01/01/2006    Per next gen, surgery for skin cancer     Tonsillectomy  01/01/1986    Upper gi endoscopy,biopsy  06/24/2014    Vasectomy  1997   [3]   Current Outpatient Medications:     nebivolol 5 MG Oral Tab, Take 1 tablet (5 mg total) by mouth every morning., Disp: 90 tablet, Rfl: 3    ezetimibe-simvastatin 10-20 MG Oral Tab, TAKE 1 TABLET NIGHTLY AT BEDTIME, Disp: 90 tablet, Rfl: 3    Cholecalciferol (VITAMIN D) 50 MCG (2000 UT) Oral Cap, Take by mouth., Disp: , Rfl:     Multiple Vitamin (MULTI-VITAMIN DAILY) Oral Tab, Take by mouth., Disp: , Rfl:     ondansetron 4 MG Oral Tablet Dispersible, Take 1 tablet (4 mg total) by mouth every 8 (eight) hours as needed for Nausea., Disp: 20 tablet, Rfl: 0    acetaminophen (TYLENOL EXTRA STRENGTH) 500 MG Oral Tab, Take by mouth., Disp: , Rfl:     ibuprofen 200 MG Oral Tab, Take by mouth., Disp: , Rfl:   [4] No Known Allergies

## (undated) DIAGNOSIS — I10 ESSENTIAL HYPERTENSION: ICD-10-CM

## (undated) DEVICE — Device: Brand: DEFENDO AIR/WATER/SUCTION AND BIOPSY VALVE

## (undated) DEVICE — ENDOSCOPY PACK UPPER: Brand: MEDLINE INDUSTRIES, INC.

## (undated) DEVICE — FORCEP RADIAL JAW 4

## (undated) NOTE — LETTER
6/18/2018              [facility]         Dear Do Farnsworth,    A refill for your Pentasa has been sent to your pharmacy for a one month supply.  However, it has been over a year since you were seen in t

## (undated) NOTE — LETTER
Denzel Contreras, [facility]       01/16/18        Patient: Princess Almanza   YOB: 1960   Date of Visit: 1/16/2018       Dear  Dr. Soraya Moran MD,      Thank you for referring Princess Almanza to my practice.   Please

## (undated) NOTE — LETTER
9/1/2021    Kush Pratt        [address]             Dear Kush Pratt,      Our records indicate that you are due for an appointment for an EGD or Upper Endoscopy and colonoscopy in November 2021, or shortly there aft

## (undated) NOTE — LETTER
11/8/2019          To Whom It May Concern:    Pankaj Scales is currently under my medical care and may not return to work at this time. Please excuse Jinjonatan Adriana for 1 days (11/3/2019). He may return to work on 11/4/2019.   Activity is restricted as follow

## (undated) NOTE — LETTER
2/3/2018          To Whom It May Concern:    Kari Moralesr ([birth year]) is currently under my medical care. He may return to work on 2/5/2018. Activity is restricted as follows: none. If you require additional information please contact our office.

## (undated) NOTE — LETTER
23      Patient: Reese Drain  : 1960 Visit date: 2023    Dear Sarai Hence,      I examined your patient in consultation today. 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 We placed him on an icing regimen and a Medrol Dosepak. Thank you for your kind referral. If I may answer any questions, please feel free to contact me.      Sincerely,   Jaye Owens MD     CC:   No Recipients

## (undated) NOTE — MR AVS SNAPSHOT
[facility]  [address] [place] [address] [phone]               Thank you for choosing us for your health care visit with Luma Rivera MD.  We are glad to serve you and happy to provide you with this summary of your vis Commonly known as:  PROTONIX           TYLENOL EXTRA STRENGTH 500 MG Tabs   Generic drug:  acetaminophen   Take  by mouth.                 Where to Get Your Medications      These medications were sent to [address], [place]

## (undated) NOTE — MR AVS SNAPSHOT
[facility]  [address] [place] [address], [address] [place] [address]  [facility]               Thank you for choosing us for your health care visit with Lizett Singh MD.  We are glad to serve you and happy to provide you with this summary of What changed:  See the new instructions. Commonly known as:  PROTONIX           TYLENOL EXTRA STRENGTH 500 MG Tabs   Generic drug:  acetaminophen   Take  by mouth.                 Where to Get Your Medications      These medications were sent to [facility] You can access your MyChart to more actively manage your health care and view more details from this visit by going to [email or web address].   If you've recently had a stay at the Hospital you can access your discharge instructions in [address] by tima You don’t need to join a gym. Home exercises work great.  Put more priority on exercise in your life                    Visit [facility] online at  [email or web address]

## (undated) NOTE — LETTER
10/19/2017          To Whom It May Concern:    Leelee Espinosa is currently under my medical care and may not return to work at this time. Please excuse [address] for 0 days. He may return to work on 10/20/2017. Activity is restricted as follows: none.

## (undated) NOTE — LETTER
24      Patient: Delmer Ro  : 1960 Visit date: 2024    Dear Avinash,      I examined your patient in consultation today.    He has persistent left thumb trapeziometacarpal arthritis, which was aggravated by a traction injury.    We placed him in a leydi splint.    Thank you for your kind referral. If I may answer any questions, please feel free to contact me.     Sincerely,   Bryon Ortiz MD     CC:   No Recipients

## (undated) NOTE — LETTER
[facility] [place] [address]  [address] [facility] [address]  Dept: [phone]  Dept Fax: [phone]  Loc: [phone]      October 14, 2017    Patient: Bonilla Jackman   Date of Visit: 10/14/2017       To Whom It May Concern:    Angel

## (undated) NOTE — LETTER
Date & Time: 5/23/2018, 8:31 AM  Patient: Paris Barron  Encounter Provider(s):    Sanchez Mckeon       To Whom It May Concern:    Marck Kate was seen and treated in our department on 5/23/2018. Excuse from work 5/24/18.     If you have any qu

## (undated) NOTE — LETTER
Date & Time: 5/23/2018, 8:31 AM  Patient: Bonilla Jackman  Encounter Provider(s):    Sanchez Santiago       To Whom It May Concern:    Ann Lindsey was seen and treated in our department on 5/23/2018. Excuse from work 5/27/18.      If you have any

## (undated) NOTE — ED AVS SNAPSHOT
[facility] in [address] [address] Lavelle Martinez    Phone:  [phone]    Fax:  Margot Romano 1946   MRN: [id number]    Department:  [facility] in [place]   Date of Visit:  4 may not be covered by your plan. It is possible that the physician may not participate in your health insurance plan. This may result in a lower benefit level being available to you or other limited reimbursement.   The physician may seek payment directly If you have been prescribed any medication(s), please fill your prescription right away and begin taking the medication(s) as directed.   If you believe that any of the medications or instructions on this list is different from what your Primary Care doctor harming yourself, contact [facility] at [phone]. - If you don’t have insurance, [facility] has partnered with Patient [address] to help you get signed up for insurance coverage.   Patient [place]

## (undated) NOTE — LETTER
Dear Dr. Eladio Og V    This letter is to inform you that Leocadia Mcburney has been attending Physical Therapy with me. See below for my most recent plan of care.        Patient Name: Leocadia Mcburney, : 1960, MRN: [id number]   Date:  2018  Refe __3____5. Gait and pivot turn (asymptomatic)  __3____6. Step over obstacle  __3____7. Gait with narrow base of support-  # of steps__10______  __3____8. Gait with eyes closed-  Time: ____6___seconds  __3____9. Ambulating backward  __3____10.  Steps    TOTAL

## (undated) NOTE — LETTER
06/07/19        [address] [facility]      Dear Tiny Kevin records indicate that you have outstanding lab work and or testing that was ordered for you and has not yet been completed:  Orders Placed This Encounter

## (undated) NOTE — LETTER
[facility] [place] [address]  [address] [facility] [address]  Dept: [phone]  Dept Fax: [phone]  Loc: [phone]      April 29, 2017    Patient: Ramakrishna Tung   Date of Visit: 4/29/2017       To Whom It May Concern:    Denis Thompson

## (undated) NOTE — LETTER
[address] [facility]    Consent for Coronary CT Angiography    Your doctor has recommended that you [facility] have a Coronary CT Angiography procedure.  Coronary CT Angiography is a diagnostic procedure using computed tomograph to medication can range from very minor to very serious leading to a life threatening situation or even death. Please be sure to communicate any allergy you may have to your caregiver immediately.     The information that is obtained during your testing kristin